# Patient Record
Sex: MALE | Race: WHITE | NOT HISPANIC OR LATINO | URBAN - METROPOLITAN AREA
[De-identification: names, ages, dates, MRNs, and addresses within clinical notes are randomized per-mention and may not be internally consistent; named-entity substitution may affect disease eponyms.]

---

## 2017-10-03 ENCOUNTER — OFFICE VISIT - HEALTHEAST (OUTPATIENT)
Dept: GERIATRICS | Facility: CLINIC | Age: 57
End: 2017-10-03

## 2017-10-03 ENCOUNTER — AMBULATORY - HEALTHEAST (OUTPATIENT)
Dept: ADMINISTRATIVE | Facility: CLINIC | Age: 57
End: 2017-10-03

## 2017-10-03 DIAGNOSIS — G47.33 OSA ON CPAP: ICD-10-CM

## 2017-10-03 DIAGNOSIS — R19.7 DIARRHEA: ICD-10-CM

## 2017-10-03 DIAGNOSIS — E11.9 DM2 (DIABETES MELLITUS, TYPE 2) (H): ICD-10-CM

## 2017-10-03 DIAGNOSIS — L03.116 LEFT LEG CELLULITIS: ICD-10-CM

## 2017-10-03 DIAGNOSIS — R09.02 HYPOXIA: ICD-10-CM

## 2017-10-03 DIAGNOSIS — I10 HTN (HYPERTENSION): ICD-10-CM

## 2017-10-03 RX ORDER — FUROSEMIDE 40 MG
40 TABLET ORAL 2 TIMES DAILY
Status: SHIPPED | COMMUNITY
Start: 2017-10-03

## 2017-10-03 RX ORDER — LIRAGLUTIDE 6 MG/ML
1.8 INJECTION SUBCUTANEOUS DAILY
Status: SHIPPED | COMMUNITY
Start: 2017-10-03

## 2017-10-06 ENCOUNTER — OFFICE VISIT - HEALTHEAST (OUTPATIENT)
Dept: GERIATRICS | Facility: CLINIC | Age: 57
End: 2017-10-06

## 2017-10-06 DIAGNOSIS — E11.9 DM2 (DIABETES MELLITUS, TYPE 2) (H): ICD-10-CM

## 2017-10-06 DIAGNOSIS — G47.33 OSA ON CPAP: ICD-10-CM

## 2017-10-06 DIAGNOSIS — L03.116 LEFT LEG CELLULITIS: ICD-10-CM

## 2017-10-06 DIAGNOSIS — I10 HTN (HYPERTENSION): ICD-10-CM

## 2017-10-10 ENCOUNTER — OFFICE VISIT - HEALTHEAST (OUTPATIENT)
Dept: GERIATRICS | Facility: CLINIC | Age: 57
End: 2017-10-10

## 2017-10-10 DIAGNOSIS — R09.02 HYPOXIA: ICD-10-CM

## 2017-10-10 DIAGNOSIS — I10 HTN (HYPERTENSION): ICD-10-CM

## 2017-10-10 DIAGNOSIS — L03.116 LEFT LEG CELLULITIS: ICD-10-CM

## 2017-10-10 DIAGNOSIS — E11.9 DM2 (DIABETES MELLITUS, TYPE 2) (H): ICD-10-CM

## 2017-10-17 ENCOUNTER — OFFICE VISIT - HEALTHEAST (OUTPATIENT)
Dept: GERIATRICS | Facility: CLINIC | Age: 57
End: 2017-10-17

## 2017-10-17 DIAGNOSIS — I48.91 A-FIB (H): ICD-10-CM

## 2017-10-17 DIAGNOSIS — G47.33 OSA ON CPAP: ICD-10-CM

## 2017-10-17 DIAGNOSIS — I10 HYPERTENSION: ICD-10-CM

## 2017-10-17 DIAGNOSIS — I89.0 LYMPHEDEMA: ICD-10-CM

## 2017-10-17 DIAGNOSIS — E11.9 DIABETES MELLITUS (H): ICD-10-CM

## 2017-10-17 DIAGNOSIS — R26.89 BALANCE DISORDER: ICD-10-CM

## 2017-10-17 DIAGNOSIS — R29.898 LEG WEAKNESS: ICD-10-CM

## 2017-10-20 ENCOUNTER — OFFICE VISIT - HEALTHEAST (OUTPATIENT)
Dept: GERIATRICS | Facility: CLINIC | Age: 57
End: 2017-10-20

## 2017-10-20 DIAGNOSIS — R26.89 BALANCE DISORDER: ICD-10-CM

## 2017-10-20 DIAGNOSIS — S81.802A LEG WOUND, LEFT: ICD-10-CM

## 2017-10-20 DIAGNOSIS — R29.898 WEAKNESS OF BOTH LOWER EXTREMITIES: ICD-10-CM

## 2017-10-20 DIAGNOSIS — I10 HYPERTENSION: ICD-10-CM

## 2017-10-20 DIAGNOSIS — I89.0 LYMPHEDEMA: ICD-10-CM

## 2017-10-24 ENCOUNTER — OFFICE VISIT - HEALTHEAST (OUTPATIENT)
Dept: GERIATRICS | Facility: CLINIC | Age: 57
End: 2017-10-24

## 2017-10-24 DIAGNOSIS — I89.0 LYMPHEDEMA: ICD-10-CM

## 2017-10-24 DIAGNOSIS — I10 HYPERTENSION: ICD-10-CM

## 2017-10-24 DIAGNOSIS — R29.898 WEAKNESS OF BOTH LOWER EXTREMITIES: ICD-10-CM

## 2017-10-24 DIAGNOSIS — E11.9 DIABETES MELLITUS (H): ICD-10-CM

## 2017-10-27 ENCOUNTER — OFFICE VISIT - HEALTHEAST (OUTPATIENT)
Dept: GERIATRICS | Facility: CLINIC | Age: 57
End: 2017-10-27

## 2017-10-27 DIAGNOSIS — R29.898 WEAKNESS OF BOTH LOWER EXTREMITIES: ICD-10-CM

## 2017-10-27 DIAGNOSIS — M25.569 KNEE PAIN: ICD-10-CM

## 2017-10-27 DIAGNOSIS — I10 HYPERTENSION: ICD-10-CM

## 2017-10-27 DIAGNOSIS — M22.2X9 PATELLA-FEMORAL SYNDROME: ICD-10-CM

## 2017-10-31 ENCOUNTER — OFFICE VISIT - HEALTHEAST (OUTPATIENT)
Dept: GERIATRICS | Facility: CLINIC | Age: 57
End: 2017-10-31

## 2017-10-31 DIAGNOSIS — G47.33 OSA ON CPAP: ICD-10-CM

## 2017-10-31 DIAGNOSIS — I10 HYPERTENSION: ICD-10-CM

## 2017-10-31 DIAGNOSIS — I89.0 LYMPHEDEMA: ICD-10-CM

## 2017-10-31 DIAGNOSIS — M22.2X9 PATELLA-FEMORAL SYNDROME: ICD-10-CM

## 2017-11-03 ENCOUNTER — OFFICE VISIT - HEALTHEAST (OUTPATIENT)
Dept: GERIATRICS | Facility: CLINIC | Age: 57
End: 2017-11-03

## 2017-11-03 DIAGNOSIS — I10 HYPERTENSION: ICD-10-CM

## 2017-11-03 DIAGNOSIS — G47.33 OSA ON CPAP: ICD-10-CM

## 2017-11-03 DIAGNOSIS — E11.9 DIABETES MELLITUS (H): ICD-10-CM

## 2017-11-03 DIAGNOSIS — R29.898 WEAKNESS OF BOTH LOWER EXTREMITIES: ICD-10-CM

## 2017-11-07 ENCOUNTER — OFFICE VISIT - HEALTHEAST (OUTPATIENT)
Dept: GERIATRICS | Facility: CLINIC | Age: 57
End: 2017-11-07

## 2017-11-07 DIAGNOSIS — I10 HYPERTENSION: ICD-10-CM

## 2017-11-07 DIAGNOSIS — R26.89 BALANCE DISORDER: ICD-10-CM

## 2017-11-07 DIAGNOSIS — R29.898 WEAKNESS OF BOTH LOWER EXTREMITIES: ICD-10-CM

## 2017-11-07 DIAGNOSIS — I89.0 LYMPHEDEMA: ICD-10-CM

## 2017-11-10 ENCOUNTER — AMBULATORY - HEALTHEAST (OUTPATIENT)
Dept: GERIATRICS | Facility: CLINIC | Age: 57
End: 2017-11-10

## 2018-04-30 ENCOUNTER — COMMUNICATION - HEALTHEAST (OUTPATIENT)
Dept: TELEHEALTH | Facility: CLINIC | Age: 58
End: 2018-04-30

## 2018-04-30 ENCOUNTER — OFFICE VISIT - HEALTHEAST (OUTPATIENT)
Dept: VASCULAR SURGERY | Facility: CLINIC | Age: 58
End: 2018-04-30

## 2018-04-30 DIAGNOSIS — M79.89 LEG SWELLING: ICD-10-CM

## 2018-04-30 DIAGNOSIS — I87.2 VENOUS INSUFFICIENCY OF BOTH LOWER EXTREMITIES: ICD-10-CM

## 2018-04-30 DIAGNOSIS — I89.0 ACQUIRED LYMPHEDEMA OF LEG: ICD-10-CM

## 2018-04-30 DIAGNOSIS — M14.671 CHARCOT'S JOINT, RIGHT ANKLE AND FOOT: ICD-10-CM

## 2018-04-30 DIAGNOSIS — E11.42 DIABETIC PERIPHERAL NEUROPATHY ASSOCIATED WITH TYPE 2 DIABETES MELLITUS (H): ICD-10-CM

## 2018-04-30 DIAGNOSIS — M79.604 PAIN IN BOTH LOWER EXTREMITIES: ICD-10-CM

## 2018-04-30 DIAGNOSIS — E66.01 MORBID OBESITY WITH BMI OF 50.0-59.9, ADULT (H): ICD-10-CM

## 2018-04-30 DIAGNOSIS — M79.89 SWELLING OF LEFT EXTREMITY: ICD-10-CM

## 2018-04-30 DIAGNOSIS — M79.605 PAIN IN BOTH LOWER EXTREMITIES: ICD-10-CM

## 2018-04-30 DIAGNOSIS — L90.5 SCAR CONDITION AND FIBROSIS OF SKIN: ICD-10-CM

## 2018-04-30 ASSESSMENT — MIFFLIN-ST. JEOR: SCORE: 2808.47

## 2018-05-01 ENCOUNTER — COMMUNICATION - HEALTHEAST (OUTPATIENT)
Dept: SURGERY | Facility: CLINIC | Age: 58
End: 2018-05-01

## 2018-05-03 ENCOUNTER — RECORDS - HEALTHEAST (OUTPATIENT)
Dept: VASCULAR ULTRASOUND | Facility: CLINIC | Age: 58
End: 2018-05-03

## 2018-05-03 DIAGNOSIS — M79.605 PAIN IN LEFT LEG: ICD-10-CM

## 2018-05-03 DIAGNOSIS — M79.89 OTHER SPECIFIED SOFT TISSUE DISORDERS: ICD-10-CM

## 2018-05-03 DIAGNOSIS — I87.2 VENOUS INSUFFICIENCY (CHRONIC) (PERIPHERAL): ICD-10-CM

## 2018-05-03 DIAGNOSIS — M14.671 CHARCOT'S JOINT, RIGHT ANKLE AND FOOT: ICD-10-CM

## 2018-05-03 DIAGNOSIS — E11.42 TYPE 2 DIABETES MELLITUS WITH DIABETIC POLYNEUROPATHY (H): ICD-10-CM

## 2018-05-03 DIAGNOSIS — L90.5 SCAR CONDITIONS AND FIBROSIS OF SKIN: ICD-10-CM

## 2018-05-03 DIAGNOSIS — I89.0 LYMPHEDEMA, NOT ELSEWHERE CLASSIFIED: ICD-10-CM

## 2018-05-03 DIAGNOSIS — M79.604 PAIN IN RIGHT LEG: ICD-10-CM

## 2018-05-03 DIAGNOSIS — E66.01 MORBID (SEVERE) OBESITY DUE TO EXCESS CALORIES (H): ICD-10-CM

## 2018-05-04 ENCOUNTER — COMMUNICATION - HEALTHEAST (OUTPATIENT)
Dept: VASCULAR SURGERY | Facility: CLINIC | Age: 58
End: 2018-05-04

## 2018-05-07 ENCOUNTER — COMMUNICATION - HEALTHEAST (OUTPATIENT)
Dept: VASCULAR SURGERY | Facility: CLINIC | Age: 58
End: 2018-05-07

## 2018-05-16 ENCOUNTER — OFFICE VISIT - HEALTHEAST (OUTPATIENT)
Dept: PHYSICAL THERAPY | Facility: REHABILITATION | Age: 58
End: 2018-05-16

## 2018-05-16 DIAGNOSIS — I87.2 VENOUS INSUFFICIENCY OF BOTH LOWER EXTREMITIES: ICD-10-CM

## 2018-05-16 DIAGNOSIS — L90.5 SCAR CONDITION AND FIBROSIS OF SKIN: ICD-10-CM

## 2018-05-16 DIAGNOSIS — E66.01 MORBID OBESITY WITH BMI OF 50.0-59.9, ADULT (H): ICD-10-CM

## 2018-05-16 DIAGNOSIS — I89.0 ACQUIRED LYMPHEDEMA OF LEG: ICD-10-CM

## 2018-05-16 DIAGNOSIS — M79.89 SWELLING OF LEFT EXTREMITY: ICD-10-CM

## 2018-05-16 DIAGNOSIS — M79.605 PAIN IN BOTH LOWER EXTREMITIES: ICD-10-CM

## 2018-05-16 DIAGNOSIS — M79.89 LEG SWELLING: ICD-10-CM

## 2018-05-16 DIAGNOSIS — M14.671 CHARCOT'S JOINT, RIGHT ANKLE AND FOOT: ICD-10-CM

## 2018-05-16 DIAGNOSIS — M79.604 PAIN IN BOTH LOWER EXTREMITIES: ICD-10-CM

## 2018-05-16 DIAGNOSIS — E11.42 DIABETIC PERIPHERAL NEUROPATHY ASSOCIATED WITH TYPE 2 DIABETES MELLITUS (H): ICD-10-CM

## 2018-05-18 ENCOUNTER — OFFICE VISIT - HEALTHEAST (OUTPATIENT)
Dept: PHYSICAL THERAPY | Facility: REHABILITATION | Age: 58
End: 2018-05-18

## 2018-05-18 DIAGNOSIS — E11.42 DIABETIC PERIPHERAL NEUROPATHY ASSOCIATED WITH TYPE 2 DIABETES MELLITUS (H): ICD-10-CM

## 2018-05-18 DIAGNOSIS — E66.01 MORBID OBESITY WITH BMI OF 50.0-59.9, ADULT (H): ICD-10-CM

## 2018-05-18 DIAGNOSIS — I89.0 ACQUIRED LYMPHEDEMA OF LEG: ICD-10-CM

## 2018-05-18 DIAGNOSIS — M14.671 CHARCOT'S JOINT, RIGHT ANKLE AND FOOT: ICD-10-CM

## 2018-05-18 DIAGNOSIS — I87.2 VENOUS INSUFFICIENCY OF BOTH LOWER EXTREMITIES: ICD-10-CM

## 2018-05-18 DIAGNOSIS — M79.89 LEG SWELLING: ICD-10-CM

## 2018-05-18 DIAGNOSIS — M79.604 PAIN IN BOTH LOWER EXTREMITIES: ICD-10-CM

## 2018-05-18 DIAGNOSIS — L90.5 SCAR CONDITION AND FIBROSIS OF SKIN: ICD-10-CM

## 2018-05-18 DIAGNOSIS — M79.89 SWELLING OF LEFT EXTREMITY: ICD-10-CM

## 2018-05-18 DIAGNOSIS — M79.605 PAIN IN BOTH LOWER EXTREMITIES: ICD-10-CM

## 2018-05-22 ENCOUNTER — OFFICE VISIT - HEALTHEAST (OUTPATIENT)
Dept: PHYSICAL THERAPY | Facility: REHABILITATION | Age: 58
End: 2018-05-22

## 2018-05-22 DIAGNOSIS — E11.42 DIABETIC PERIPHERAL NEUROPATHY ASSOCIATED WITH TYPE 2 DIABETES MELLITUS (H): ICD-10-CM

## 2018-05-22 DIAGNOSIS — L90.5 SCAR CONDITION AND FIBROSIS OF SKIN: ICD-10-CM

## 2018-05-22 DIAGNOSIS — I87.2 VENOUS INSUFFICIENCY OF BOTH LOWER EXTREMITIES: ICD-10-CM

## 2018-05-22 DIAGNOSIS — M79.604 PAIN IN BOTH LOWER EXTREMITIES: ICD-10-CM

## 2018-05-22 DIAGNOSIS — E66.01 MORBID OBESITY WITH BMI OF 50.0-59.9, ADULT (H): ICD-10-CM

## 2018-05-22 DIAGNOSIS — M79.605 PAIN IN BOTH LOWER EXTREMITIES: ICD-10-CM

## 2018-05-22 DIAGNOSIS — M14.671 CHARCOT'S JOINT, RIGHT ANKLE AND FOOT: ICD-10-CM

## 2018-05-22 DIAGNOSIS — I89.0 ACQUIRED LYMPHEDEMA OF LEG: ICD-10-CM

## 2018-05-22 DIAGNOSIS — M79.89 SWELLING OF LEFT EXTREMITY: ICD-10-CM

## 2018-05-22 DIAGNOSIS — M79.89 LEG SWELLING: ICD-10-CM

## 2018-05-24 ENCOUNTER — OFFICE VISIT - HEALTHEAST (OUTPATIENT)
Dept: PHYSICAL THERAPY | Facility: REHABILITATION | Age: 58
End: 2018-05-24

## 2018-05-24 DIAGNOSIS — M79.89 SWELLING OF LEFT EXTREMITY: ICD-10-CM

## 2018-05-24 DIAGNOSIS — L90.5 SCAR CONDITION AND FIBROSIS OF SKIN: ICD-10-CM

## 2018-05-24 DIAGNOSIS — I87.2 VENOUS INSUFFICIENCY OF BOTH LOWER EXTREMITIES: ICD-10-CM

## 2018-05-24 DIAGNOSIS — M79.89 LEG SWELLING: ICD-10-CM

## 2018-05-25 ENCOUNTER — COMMUNICATION - HEALTHEAST (OUTPATIENT)
Dept: VASCULAR SURGERY | Facility: CLINIC | Age: 58
End: 2018-05-25

## 2018-05-29 ENCOUNTER — OFFICE VISIT - HEALTHEAST (OUTPATIENT)
Dept: PHYSICAL THERAPY | Facility: REHABILITATION | Age: 58
End: 2018-05-29

## 2018-05-29 DIAGNOSIS — M79.605 PAIN IN BOTH LOWER EXTREMITIES: ICD-10-CM

## 2018-05-29 DIAGNOSIS — M79.89 SWELLING OF LEFT EXTREMITY: ICD-10-CM

## 2018-05-29 DIAGNOSIS — M79.604 PAIN IN BOTH LOWER EXTREMITIES: ICD-10-CM

## 2018-05-29 DIAGNOSIS — E66.01 MORBID OBESITY WITH BMI OF 50.0-59.9, ADULT (H): ICD-10-CM

## 2018-05-29 DIAGNOSIS — E11.42 DIABETIC PERIPHERAL NEUROPATHY ASSOCIATED WITH TYPE 2 DIABETES MELLITUS (H): ICD-10-CM

## 2018-05-29 DIAGNOSIS — I87.2 VENOUS INSUFFICIENCY OF BOTH LOWER EXTREMITIES: ICD-10-CM

## 2018-05-29 DIAGNOSIS — I89.0 ACQUIRED LYMPHEDEMA OF LEG: ICD-10-CM

## 2018-05-29 DIAGNOSIS — M79.89 LEG SWELLING: ICD-10-CM

## 2018-05-29 DIAGNOSIS — M14.671 CHARCOT'S JOINT, RIGHT ANKLE AND FOOT: ICD-10-CM

## 2018-05-29 DIAGNOSIS — L90.5 SCAR CONDITION AND FIBROSIS OF SKIN: ICD-10-CM

## 2018-05-30 ENCOUNTER — OFFICE VISIT - HEALTHEAST (OUTPATIENT)
Dept: VASCULAR SURGERY | Facility: CLINIC | Age: 58
End: 2018-05-30

## 2018-05-30 DIAGNOSIS — L90.5 SCAR CONDITION AND FIBROSIS OF SKIN: ICD-10-CM

## 2018-05-30 DIAGNOSIS — E11.42 DIABETIC PERIPHERAL NEUROPATHY ASSOCIATED WITH TYPE 2 DIABETES MELLITUS (H): ICD-10-CM

## 2018-05-30 DIAGNOSIS — I89.0 ACQUIRED LYMPHEDEMA OF LEG: ICD-10-CM

## 2018-05-30 DIAGNOSIS — L97.223 ULCER OF LEFT CALF WITH NECROSIS OF MUSCLE (H): ICD-10-CM

## 2018-05-30 DIAGNOSIS — I87.2 VENOUS INSUFFICIENCY OF BOTH LOWER EXTREMITIES: ICD-10-CM

## 2018-05-30 DIAGNOSIS — E66.01 MORBID OBESITY WITH BMI OF 50.0-59.9, ADULT (H): ICD-10-CM

## 2018-05-30 ASSESSMENT — MIFFLIN-ST. JEOR: SCORE: 2808.47

## 2018-06-01 ENCOUNTER — OFFICE VISIT - HEALTHEAST (OUTPATIENT)
Dept: PHYSICAL THERAPY | Facility: REHABILITATION | Age: 58
End: 2018-06-01

## 2018-06-01 DIAGNOSIS — I87.2 VENOUS INSUFFICIENCY OF BOTH LOWER EXTREMITIES: ICD-10-CM

## 2018-06-01 DIAGNOSIS — M14.671 CHARCOT'S JOINT, RIGHT ANKLE AND FOOT: ICD-10-CM

## 2018-06-01 DIAGNOSIS — L90.5 SCAR CONDITION AND FIBROSIS OF SKIN: ICD-10-CM

## 2018-06-01 DIAGNOSIS — I89.0 ACQUIRED LYMPHEDEMA OF LEG: ICD-10-CM

## 2018-06-01 DIAGNOSIS — M79.89 SWELLING OF LEFT EXTREMITY: ICD-10-CM

## 2018-06-01 DIAGNOSIS — M79.89 LEG SWELLING: ICD-10-CM

## 2018-06-01 DIAGNOSIS — M79.605 PAIN IN BOTH LOWER EXTREMITIES: ICD-10-CM

## 2018-06-01 DIAGNOSIS — E11.42 DIABETIC PERIPHERAL NEUROPATHY ASSOCIATED WITH TYPE 2 DIABETES MELLITUS (H): ICD-10-CM

## 2018-06-01 DIAGNOSIS — E66.01 MORBID OBESITY WITH BMI OF 50.0-59.9, ADULT (H): ICD-10-CM

## 2018-06-01 DIAGNOSIS — M79.604 PAIN IN BOTH LOWER EXTREMITIES: ICD-10-CM

## 2018-06-02 LAB
BACTERIA SPEC CULT: ABNORMAL
GRAM STAIN RESULT: ABNORMAL
GRAM STAIN RESULT: ABNORMAL

## 2018-06-05 ENCOUNTER — OFFICE VISIT - HEALTHEAST (OUTPATIENT)
Dept: PHYSICAL THERAPY | Facility: REHABILITATION | Age: 58
End: 2018-06-05

## 2018-06-05 DIAGNOSIS — I89.0 ACQUIRED LYMPHEDEMA OF LEG: ICD-10-CM

## 2018-06-05 DIAGNOSIS — L90.5 SCAR CONDITION AND FIBROSIS OF SKIN: ICD-10-CM

## 2018-06-05 DIAGNOSIS — M79.605 PAIN IN BOTH LOWER EXTREMITIES: ICD-10-CM

## 2018-06-05 DIAGNOSIS — I87.2 VENOUS INSUFFICIENCY OF BOTH LOWER EXTREMITIES: ICD-10-CM

## 2018-06-05 DIAGNOSIS — E66.01 MORBID OBESITY WITH BMI OF 50.0-59.9, ADULT (H): ICD-10-CM

## 2018-06-05 DIAGNOSIS — M79.89 SWELLING OF LEFT EXTREMITY: ICD-10-CM

## 2018-06-05 DIAGNOSIS — E11.42 DIABETIC PERIPHERAL NEUROPATHY ASSOCIATED WITH TYPE 2 DIABETES MELLITUS (H): ICD-10-CM

## 2018-06-05 DIAGNOSIS — M79.604 PAIN IN BOTH LOWER EXTREMITIES: ICD-10-CM

## 2018-06-05 DIAGNOSIS — M79.89 LEG SWELLING: ICD-10-CM

## 2018-06-05 DIAGNOSIS — M14.671 CHARCOT'S JOINT, RIGHT ANKLE AND FOOT: ICD-10-CM

## 2018-06-06 ENCOUNTER — OFFICE VISIT - HEALTHEAST (OUTPATIENT)
Dept: PHYSICAL THERAPY | Facility: REHABILITATION | Age: 58
End: 2018-06-06

## 2018-06-06 DIAGNOSIS — I87.2 VENOUS INSUFFICIENCY OF BOTH LOWER EXTREMITIES: ICD-10-CM

## 2018-06-06 DIAGNOSIS — M79.89 SWELLING OF LEFT EXTREMITY: ICD-10-CM

## 2018-06-06 DIAGNOSIS — I89.0 ACQUIRED LYMPHEDEMA OF LEG: ICD-10-CM

## 2018-06-06 DIAGNOSIS — L90.5 SCAR CONDITION AND FIBROSIS OF SKIN: ICD-10-CM

## 2018-06-06 DIAGNOSIS — M79.89 LEG SWELLING: ICD-10-CM

## 2018-06-08 ENCOUNTER — OFFICE VISIT - HEALTHEAST (OUTPATIENT)
Dept: PHYSICAL THERAPY | Facility: REHABILITATION | Age: 58
End: 2018-06-08

## 2018-06-08 DIAGNOSIS — E11.42 DIABETIC PERIPHERAL NEUROPATHY ASSOCIATED WITH TYPE 2 DIABETES MELLITUS (H): ICD-10-CM

## 2018-06-08 DIAGNOSIS — E66.01 MORBID OBESITY WITH BMI OF 50.0-59.9, ADULT (H): ICD-10-CM

## 2018-06-08 DIAGNOSIS — I87.2 VENOUS INSUFFICIENCY OF BOTH LOWER EXTREMITIES: ICD-10-CM

## 2018-06-08 DIAGNOSIS — M79.605 PAIN IN BOTH LOWER EXTREMITIES: ICD-10-CM

## 2018-06-08 DIAGNOSIS — M14.671 CHARCOT'S JOINT, RIGHT ANKLE AND FOOT: ICD-10-CM

## 2018-06-08 DIAGNOSIS — M79.89 SWELLING OF LEFT EXTREMITY: ICD-10-CM

## 2018-06-08 DIAGNOSIS — M79.604 PAIN IN BOTH LOWER EXTREMITIES: ICD-10-CM

## 2018-06-08 DIAGNOSIS — L90.5 SCAR CONDITION AND FIBROSIS OF SKIN: ICD-10-CM

## 2018-06-08 DIAGNOSIS — I89.0 ACQUIRED LYMPHEDEMA OF LEG: ICD-10-CM

## 2018-06-08 DIAGNOSIS — M79.89 LEG SWELLING: ICD-10-CM

## 2018-06-13 ENCOUNTER — OFFICE VISIT - HEALTHEAST (OUTPATIENT)
Dept: PHYSICAL THERAPY | Facility: REHABILITATION | Age: 58
End: 2018-06-13

## 2018-06-13 DIAGNOSIS — M79.89 SWELLING OF LEFT EXTREMITY: ICD-10-CM

## 2018-06-13 DIAGNOSIS — M79.89 LEG SWELLING: ICD-10-CM

## 2018-06-13 DIAGNOSIS — L90.5 SCAR CONDITION AND FIBROSIS OF SKIN: ICD-10-CM

## 2018-06-13 DIAGNOSIS — I87.2 VENOUS INSUFFICIENCY OF BOTH LOWER EXTREMITIES: ICD-10-CM

## 2018-06-18 ENCOUNTER — RECORDS - HEALTHEAST (OUTPATIENT)
Dept: VASCULAR ULTRASOUND | Facility: CLINIC | Age: 58
End: 2018-06-18

## 2018-06-18 ENCOUNTER — OFFICE VISIT - HEALTHEAST (OUTPATIENT)
Dept: VASCULAR SURGERY | Facility: CLINIC | Age: 58
End: 2018-06-18

## 2018-06-18 DIAGNOSIS — I87.2 VENOUS INSUFFICIENCY (CHRONIC) (PERIPHERAL): ICD-10-CM

## 2018-06-18 DIAGNOSIS — L90.5 SCAR CONDITION AND FIBROSIS OF SKIN: ICD-10-CM

## 2018-06-18 DIAGNOSIS — E66.01 MORBID OBESITY WITH BMI OF 50.0-59.9, ADULT (H): ICD-10-CM

## 2018-06-18 DIAGNOSIS — M79.89 OTHER SPECIFIED SOFT TISSUE DISORDERS: ICD-10-CM

## 2018-06-18 DIAGNOSIS — L97.922 NON-PRESSURE CHRONIC ULCER OF UNSPECIFIED PART OF LEFT LOWER LEG WITH FAT LAYER EXPOSED (H): ICD-10-CM

## 2018-06-18 DIAGNOSIS — I89.0 ACQUIRED LYMPHEDEMA OF LEG: ICD-10-CM

## 2018-06-18 DIAGNOSIS — M79.89 LEG SWELLING: ICD-10-CM

## 2018-06-18 DIAGNOSIS — L97.922 LEG ULCER, LEFT, WITH FAT LAYER EXPOSED (H): ICD-10-CM

## 2018-06-18 DIAGNOSIS — L90.5 SCAR CONDITIONS AND FIBROSIS OF SKIN: ICD-10-CM

## 2018-06-18 DIAGNOSIS — I89.0 LYMPHEDEMA, NOT ELSEWHERE CLASSIFIED: ICD-10-CM

## 2018-06-18 DIAGNOSIS — L97.223 ULCER OF LEFT CALF WITH NECROSIS OF MUSCLE (H): ICD-10-CM

## 2018-06-18 DIAGNOSIS — M79.89 SWELLING OF LEFT EXTREMITY: ICD-10-CM

## 2018-06-18 DIAGNOSIS — I87.2 VENOUS INSUFFICIENCY OF BOTH LOWER EXTREMITIES: ICD-10-CM

## 2018-06-18 DIAGNOSIS — E11.42 TYPE 2 DIABETES MELLITUS WITH DIABETIC POLYNEUROPATHY (H): ICD-10-CM

## 2018-06-18 DIAGNOSIS — E11.42 DIABETIC PERIPHERAL NEUROPATHY ASSOCIATED WITH TYPE 2 DIABETES MELLITUS (H): ICD-10-CM

## 2018-06-18 DIAGNOSIS — E66.01 MORBID (SEVERE) OBESITY DUE TO EXCESS CALORIES (H): ICD-10-CM

## 2018-06-18 DIAGNOSIS — L97.223: ICD-10-CM

## 2018-06-18 RX ORDER — DILTIAZEM HYDROCHLORIDE 360 MG/1
360 CAPSULE, EXTENDED RELEASE ORAL DAILY
Refills: 1 | Status: SHIPPED | COMMUNITY
Start: 2018-05-30

## 2018-06-18 RX ORDER — INSULIN GLARGINE 100 [IU]/ML
20 INJECTION, SOLUTION SUBCUTANEOUS 2 TIMES DAILY
Refills: 0 | Status: SHIPPED | COMMUNITY
Start: 2018-06-06

## 2018-07-23 ENCOUNTER — OFFICE VISIT - HEALTHEAST (OUTPATIENT)
Dept: VASCULAR SURGERY | Facility: CLINIC | Age: 58
End: 2018-07-23

## 2018-07-23 DIAGNOSIS — L97.223 ULCER OF LEFT CALF WITH NECROSIS OF MUSCLE (H): ICD-10-CM

## 2018-07-23 DIAGNOSIS — E66.01 MORBID OBESITY WITH BMI OF 50.0-59.9, ADULT (H): ICD-10-CM

## 2018-07-23 DIAGNOSIS — M14.671 CHARCOT'S JOINT, RIGHT ANKLE AND FOOT: ICD-10-CM

## 2018-07-23 DIAGNOSIS — I89.0 ACQUIRED LYMPHEDEMA OF LEG: ICD-10-CM

## 2018-07-23 DIAGNOSIS — I87.2 VENOUS INSUFFICIENCY OF BOTH LOWER EXTREMITIES: ICD-10-CM

## 2018-07-23 ASSESSMENT — MIFFLIN-ST. JEOR: SCORE: 2862.9

## 2018-07-27 ENCOUNTER — AMBULATORY - HEALTHEAST (OUTPATIENT)
Dept: VASCULAR SURGERY | Facility: CLINIC | Age: 58
End: 2018-07-27

## 2018-07-27 ASSESSMENT — MIFFLIN-ST. JEOR: SCORE: 2878.32

## 2018-07-31 ENCOUNTER — AMBULATORY - HEALTHEAST (OUTPATIENT)
Dept: VASCULAR SURGERY | Facility: CLINIC | Age: 58
End: 2018-07-31

## 2018-07-31 ENCOUNTER — RECORDS - HEALTHEAST (OUTPATIENT)
Dept: ADMINISTRATIVE | Facility: OTHER | Age: 58
End: 2018-07-31

## 2018-07-31 DIAGNOSIS — I87.2 VENOUS INSUFFICIENCY OF BOTH LOWER EXTREMITIES: ICD-10-CM

## 2018-08-03 ENCOUNTER — AMBULATORY - HEALTHEAST (OUTPATIENT)
Dept: VASCULAR SURGERY | Facility: CLINIC | Age: 58
End: 2018-08-03

## 2018-08-03 DIAGNOSIS — M79.89 SWELLING OF LEFT EXTREMITY: ICD-10-CM

## 2018-08-03 DIAGNOSIS — I87.2 VENOUS INSUFFICIENCY OF BOTH LOWER EXTREMITIES: ICD-10-CM

## 2018-08-03 DIAGNOSIS — L97.223 ULCER OF LEFT CALF WITH NECROSIS OF MUSCLE (H): ICD-10-CM

## 2018-08-03 DIAGNOSIS — E66.01 MORBID OBESITY WITH BMI OF 50.0-59.9, ADULT (H): ICD-10-CM

## 2018-08-03 ASSESSMENT — MIFFLIN-ST. JEOR: SCORE: 2850.65

## 2018-08-07 ENCOUNTER — AMBULATORY - HEALTHEAST (OUTPATIENT)
Dept: VASCULAR SURGERY | Facility: CLINIC | Age: 58
End: 2018-08-07

## 2018-08-07 DIAGNOSIS — M79.89 SWELLING OF LEFT EXTREMITY: ICD-10-CM

## 2018-08-07 DIAGNOSIS — I89.0 ACQUIRED LYMPHEDEMA OF LEG: ICD-10-CM

## 2018-08-07 DIAGNOSIS — L97.223 ULCER OF LEFT CALF WITH NECROSIS OF MUSCLE (H): ICD-10-CM

## 2018-08-07 DIAGNOSIS — I87.2 VENOUS INSUFFICIENCY OF BOTH LOWER EXTREMITIES: ICD-10-CM

## 2018-08-07 ASSESSMENT — MIFFLIN-ST. JEOR: SCORE: 2861.08

## 2018-08-10 ENCOUNTER — RECORDS - HEALTHEAST (OUTPATIENT)
Dept: ADMINISTRATIVE | Facility: OTHER | Age: 58
End: 2018-08-10

## 2018-08-14 ENCOUNTER — AMBULATORY - HEALTHEAST (OUTPATIENT)
Dept: VASCULAR SURGERY | Facility: CLINIC | Age: 58
End: 2018-08-14

## 2018-08-14 DIAGNOSIS — L97.922 LEG ULCER, LEFT, WITH FAT LAYER EXPOSED (H): ICD-10-CM

## 2018-08-17 ENCOUNTER — AMBULATORY - HEALTHEAST (OUTPATIENT)
Dept: VASCULAR SURGERY | Facility: CLINIC | Age: 58
End: 2018-08-17

## 2018-08-17 DIAGNOSIS — I87.2 VENOUS INSUFFICIENCY OF BOTH LOWER EXTREMITIES: ICD-10-CM

## 2018-08-17 DIAGNOSIS — I89.0 ACQUIRED LYMPHEDEMA OF LEG: ICD-10-CM

## 2018-08-17 DIAGNOSIS — L97.922 LEG ULCER, LEFT, WITH FAT LAYER EXPOSED (H): ICD-10-CM

## 2018-08-17 DIAGNOSIS — M79.89 SWELLING OF LEFT EXTREMITY: ICD-10-CM

## 2018-08-17 ASSESSMENT — MIFFLIN-ST. JEOR: SCORE: 2858.36

## 2018-08-21 ENCOUNTER — OFFICE VISIT - HEALTHEAST (OUTPATIENT)
Dept: VASCULAR SURGERY | Facility: CLINIC | Age: 58
End: 2018-08-21

## 2018-08-21 ENCOUNTER — RECORDS - HEALTHEAST (OUTPATIENT)
Dept: ADMINISTRATIVE | Facility: OTHER | Age: 58
End: 2018-08-21

## 2018-08-21 DIAGNOSIS — L97.223 ULCER OF LEFT CALF WITH NECROSIS OF MUSCLE (H): ICD-10-CM

## 2018-08-21 DIAGNOSIS — L97.922 LEG ULCER, LEFT, WITH FAT LAYER EXPOSED (H): ICD-10-CM

## 2018-08-21 DIAGNOSIS — I89.0 ACQUIRED LYMPHEDEMA OF LEG: ICD-10-CM

## 2018-08-21 DIAGNOSIS — E66.01 MORBID OBESITY WITH BMI OF 50.0-59.9, ADULT (H): ICD-10-CM

## 2018-08-21 DIAGNOSIS — I87.2 VENOUS INSUFFICIENCY OF BOTH LOWER EXTREMITIES: ICD-10-CM

## 2018-08-21 DIAGNOSIS — M79.89 SWELLING OF LEFT EXTREMITY: ICD-10-CM

## 2018-08-21 DIAGNOSIS — L90.5 SCAR CONDITION AND FIBROSIS OF SKIN: ICD-10-CM

## 2018-08-21 DIAGNOSIS — M14.671 CHARCOT'S JOINT, RIGHT ANKLE AND FOOT: ICD-10-CM

## 2018-08-21 ASSESSMENT — MIFFLIN-ST. JEOR: SCORE: 2860.63

## 2018-09-12 ENCOUNTER — OFFICE VISIT - HEALTHEAST (OUTPATIENT)
Dept: VASCULAR SURGERY | Facility: CLINIC | Age: 58
End: 2018-09-12

## 2018-09-12 DIAGNOSIS — I89.0 ACQUIRED LYMPHEDEMA OF LEG: ICD-10-CM

## 2018-09-12 DIAGNOSIS — E66.01 MORBID OBESITY WITH BMI OF 50.0-59.9, ADULT (H): ICD-10-CM

## 2018-09-12 DIAGNOSIS — L97.922 LEG ULCER, LEFT, WITH FAT LAYER EXPOSED (H): ICD-10-CM

## 2018-09-12 DIAGNOSIS — I87.2 VENOUS INSUFFICIENCY OF BOTH LOWER EXTREMITIES: ICD-10-CM

## 2018-09-12 DIAGNOSIS — L97.223 ULCER OF LEFT CALF WITH NECROSIS OF MUSCLE (H): ICD-10-CM

## 2018-09-12 DIAGNOSIS — M79.89 SWELLING OF LEFT EXTREMITY: ICD-10-CM

## 2018-09-12 RX ORDER — LATANOPROST 50 UG/ML
SOLUTION/ DROPS OPHTHALMIC
Refills: 0 | Status: SHIPPED | COMMUNITY
Start: 2018-08-24

## 2018-09-12 RX ORDER — DORZOLAMIDE HYDROCHLORIDE AND TIMOLOL MALEATE 20; 5 MG/ML; MG/ML
SOLUTION/ DROPS OPHTHALMIC
Refills: 0 | Status: SHIPPED | COMMUNITY
Start: 2018-08-24

## 2018-09-12 ASSESSMENT — MIFFLIN-ST. JEOR: SCORE: 2896.46

## 2021-05-27 ENCOUNTER — RECORDS - HEALTHEAST (OUTPATIENT)
Dept: ADMINISTRATIVE | Facility: CLINIC | Age: 61
End: 2021-05-27

## 2021-06-01 VITALS — BODY MASS INDEX: 40.43 KG/M2 | WEIGHT: 315 LBS | HEIGHT: 74 IN

## 2021-06-01 VITALS — HEIGHT: 74 IN | WEIGHT: 315 LBS | BODY MASS INDEX: 40.43 KG/M2

## 2021-06-01 VITALS — WEIGHT: 315 LBS | BODY MASS INDEX: 40.43 KG/M2 | HEIGHT: 74 IN

## 2021-06-01 VITALS — HEIGHT: 74 IN | BODY MASS INDEX: 40.43 KG/M2 | WEIGHT: 315 LBS

## 2021-06-01 VITALS — WEIGHT: 315 LBS | HEIGHT: 74 IN | BODY MASS INDEX: 40.43 KG/M2

## 2021-06-13 NOTE — PROGRESS NOTES
[facility]    Facility:   [facility] [[id number]]   Code Status: FULL CODE      CHIEF COMPLAINT/REASON FOR VISIT:  Chief Complaint   Patient presents with     Follow Up     cell, rehab       HISTORY:      HPI: Ta is a 57 y.o. male who I had a chance to visit with secondary to his hospitalization September 17, 2017 secondary left lower extremity cellulitis along with a history of lymphedema morbid obesity generalized weakness and hypertension.  Recently earlier in the week decrease the diltiazem 240 mg rather than 360 mg and overall the blood pressures did seem to improve he was having some hypotensive episodes.  His appetite is good.  I did see him work with therapy today he does have generalized weakness and that does need a lot of assistance with ADLs.  Did see him ambulate about 10 feet he is able to take one stair.  I looked at his left leg after he was unwrapped and overall the leg looks good no secondary infection or cellulitis.  His bowels and stools have improved.  He has been room air and afebrile.    Past Medical History:   Diagnosis Date     A-fib 12/2015     KAITLIN (acute kidney injury)      Allergic rhinitis      Cellulitis of left lower extremity      Depressive disorder      Diabetes mellitus      Dysmetabolic syndrome X      HLD (hyperlipidemia)      Hypertension      Hyponatremia      Morbid obesity      TRUE on CPAP      Sepsis      Streptococcal bacteremia      Tobacco use disorder      Varicose vein of leg              Family History   Problem Relation Age of Onset     CABG Father      Heart disease Father      Coronary artery disease Father      Emphysema Father      Diabetes Mother      Social History     Social History     Marital status: Single     Spouse name: N/A     Number of children: N/A     Years of education: N/A     Social History Main Topics     Smoking status: Former Smoker     Types: Cigarettes     Quit date: 9/19/2014     Smokeless  tobacco: Never Used     Alcohol use No     Drug use: No     Sexual activity: No     Other Topics Concern     Not on file     Social History Narrative         Review of Systems  Currently he denies any chills or fever coughing wheezing chest pain dizziness or vertigo nausea vomiting headaches or stiff neck or rashes.  History of sleep apnea diabetes A. fib hypertension lymphedema      Current Outpatient Prescriptions:      acetaminophen (TYLENOL) 325 MG tablet, Take 650 mg by mouth every 4 (four) hours as needed for pain., Disp: , Rfl:      aspirin 81 mg chewable tablet, Chew 81 mg daily., Disp: , Rfl:      cholecalciferol, vitamin D3, (VITAMIN D3) 1,000 unit capsule, Take 3,000 Units by mouth daily., Disp: , Rfl:      diltiazem (CARDIZEM CD) 180 MG 24 hr capsule, Take 240 mg by mouth daily. , Disp: , Rfl:      flecainide (TAMBOCOR) 50 MG tablet, Take 50 mg by mouth every 12 (twelve) hours., Disp: , Rfl:      furosemide (LASIX) 40 MG tablet, Take 40 mg by mouth 2 (two) times a day., Disp: , Rfl:      glipiZIDE (GLUCOTROL) 10 MG 24 hr tablet, Take 10 mg by mouth 2 (two) times a day before meals. , Disp: , Rfl:      hydrALAZINE (APRESOLINE) 25 MG tablet, Take 25 mg by mouth 4 (four) times a day., Disp: , Rfl:      insulin aspart (NOVOLOG) 100 unit/mL injection, Inject under the skin 3 (three) times a day. Per sliding scale, Disp: , Rfl:      insulin glargine (LANTUS) 100 unit/mL injection, Inject 50 Units under the skin 2 (two) times a day. , Disp: , Rfl:      liraglutide (VICTOZA) 0.6 mg/0.1 mL (18 mg/3 mL) PnIj injection, Inject 1.8 mg under the skin daily., Disp: , Rfl:      lisinopril (PRINIVIL,ZESTRIL) 20 MG tablet, Take 20 mg by mouth 2 (two) times a day., Disp: , Rfl:      oxyCODONE (ROXICODONE) 5 MG immediate release tablet, Take 5 mg by mouth every 4 (four) hours as needed for pain., Disp: , Rfl:      propranolol (INDERAL) 20 MG tablet, Take 20 mg by mouth 2 (two) times a day., Disp: , Rfl:      rivaroxaban 20  mg Tab, Take 20 mg by mouth daily with supper., Disp: , Rfl:      senna-docusate (SENNOSIDES-DOCUSATE SODIUM) 8.6-50 mg tablet, Take 2 tablets by mouth 2 (two) times a day., Disp: , Rfl:     .There were no vitals filed for this visit.  Blood pressure 1 4456 pulse 81 respirations 18 saturation room air 97%  Physical Exam  Constitutional: He is oriented to person, place, and time. No distress.   HENT:   Head: Normocephalic.   Eyes: Pupils are equal, round, and reactive to light.   Neck: Neck supple. No thyromegaly present.   Cardiovascular: Normal rate, regular rhythm and normal heart sounds.    History of A. fib.  Chronic lymphedema.   Pulmonary/Chest: Breath sounds normal.   Obstructive sleep apnea, CPAP   Abdominal: Bowel sounds are normal. There is no tenderness. There is no guarding.   Musculoskeletal:   Generalized weakness   Lymphadenopathy:     He has no cervical adenopathy.   Neurological: He is oriented to person, place, and time.   Skin: Skin is warm and dry. No rash noted.  Left lower extremity resolved of cellulitis  LABS:   No results found for: WBC, HGB, HCT, MCV, PLT  No results found for this or any previous visit.          ASSESSMENT:      ICD-10-CM    1. Hypertension I10    2. Lymphedema I89.0    3. Leg wound, left S81.802A    4. Balance disorder R26.89    5. Weakness of both lower extremities R29.898        PLAN:    At this time continue to manage and follow.  Continue to encourage his therapy and various exercises.  Continue to monitor blood pressures and overall status.    .    Electronically signed by: Michael Duane Johnson, CNP

## 2021-06-13 NOTE — PROGRESS NOTES
[facility]    Facility:   [facility] [[id number]]   Code Status: FULL CODE      CHIEF COMPLAINT/REASON FOR VISIT:  Chief Complaint   Patient presents with     Follow Up     knee, rehab       HISTORY:      HPI: Ta is a 57 y.o. male who I had a chance to visit with again today secondary to his hospitalization in September secondary to left lower extremity cellulitis which is now resolved but also has been participating in therapy due to his chronic medical conditions as well as his chronic left knee pain but in talking with therapy today they state that he is able ambulate with encouragement but otherwise does need a lot of encouragement in order to ambulate and apparently he is planning on going home sooner they are planning on discharging him and they feel that even though he does have chronicity to his knees and we talked about patellofemoral syndrome that he has been able to tolerate therapy up to this point I did talk about other measures including further follow-up but he already has had a workup in the hospital including x-rays I did discuss a cortisone injection but they do not feel it is necessary at this time.  He has been normotensive and afebrile.  Blood sugars occasionally erratic depends on his appetite but they can run anywhere from 108 all the way up to 200.  He has been normotensive and afebrile.    Past Medical History:   Diagnosis Date     A-fib 12/2015     KAITLIN (acute kidney injury)      Allergic rhinitis      Cellulitis of left lower extremity      Depressive disorder      Diabetes mellitus      Dysmetabolic syndrome X      HLD (hyperlipidemia)      Hypertension      Hyponatremia      Morbid obesity      TRUE on CPAP      Sepsis      Streptococcal bacteremia      Tobacco use disorder      Varicose vein of leg              Family History   Problem Relation Age of Onset     CABG Father      Heart disease Father      Coronary artery disease Father       Emphysema Father      Diabetes Mother      Social History     Social History     Marital status: Single     Spouse name: N/A     Number of children: N/A     Years of education: N/A     Social History Main Topics     Smoking status: Former Smoker     Types: Cigarettes     Quit date: 9/19/2014     Smokeless tobacco: Never Used     Alcohol use No     Drug use: No     Sexual activity: No     Other Topics Concern     Not on file     Social History Narrative         Review of Systems  Currently he denies any chills or fever coughing wheezing chest pain dizziness or vertigo nausea vomiting headaches or stiff neck or rashes.  History of sleep apnea diabetes A. fib hypertension lymphedema        Current Outpatient Prescriptions:      acetaminophen (TYLENOL) 325 MG tablet, Take 650 mg by mouth every 4 (four) hours as needed for pain., Disp: , Rfl:      aspirin 81 mg chewable tablet, Chew 81 mg daily., Disp: , Rfl:      cholecalciferol, vitamin D3, (VITAMIN D3) 1,000 unit capsule, Take 3,000 Units by mouth daily., Disp: , Rfl:      diltiazem (CARDIZEM CD) 180 MG 24 hr capsule, Take 240 mg by mouth daily. , Disp: , Rfl:      flecainide (TAMBOCOR) 50 MG tablet, Take 50 mg by mouth every 12 (twelve) hours., Disp: , Rfl:      furosemide (LASIX) 40 MG tablet, Take 40 mg by mouth 2 (two) times a day., Disp: , Rfl:      glipiZIDE (GLUCOTROL) 10 MG 24 hr tablet, Take 10 mg by mouth 2 (two) times a day before meals. , Disp: , Rfl:      hydrALAZINE (APRESOLINE) 25 MG tablet, Take 25 mg by mouth 4 (four) times a day., Disp: , Rfl:      insulin aspart (NOVOLOG) 100 unit/mL injection, Inject under the skin 3 (three) times a day. Per sliding scale, Disp: , Rfl:      insulin glargine (LANTUS) 100 unit/mL injection, Inject 50 Units under the skin 2 (two) times a day. , Disp: , Rfl:      liraglutide (VICTOZA) 0.6 mg/0.1 mL (18 mg/3 mL) PnIj injection, Inject 1.8 mg under the skin daily., Disp: , Rfl:      lisinopril (PRINIVIL,ZESTRIL) 20 MG  tablet, Take 20 mg by mouth 2 (two) times a day., Disp: , Rfl:      oxyCODONE (ROXICODONE) 5 MG immediate release tablet, Take 5 mg by mouth every 4 (four) hours as needed for pain., Disp: , Rfl:      propranolol (INDERAL) 20 MG tablet, Take 20 mg by mouth 2 (two) times a day., Disp: , Rfl:      rivaroxaban 20 mg Tab, Take 20 mg by mouth daily with supper., Disp: , Rfl:      senna-docusate (SENNOSIDES-DOCUSATE SODIUM) 8.6-50 mg tablet, Take 2 tablets by mouth 2 (two) times a day., Disp: , Rfl:   .There were no vitals filed for this visit.  Blood pressure 144/70 pulse 79 respirations 18 saturation room air 99%  Physical Exam   Constitutional: He is oriented to person, place, and time. No distress.   HENT:   Head: Normocephalic.   Eyes:   Neck: Neck supple. No thyromegaly present.   Cardiovascular: Normal rate, regular rhythm and normal heart sounds.    History of A. fib.  Chronic lymphedema.   Pulmonary/Chest: Breath sounds normal.   Obstructive sleep apnea, CPAP   Abdominal: Protuberant there is no tenderness. There is no guarding.   Musculoskeletal:   Generalized weakness   Lymphadenopathy:     He has no cervical adenopathy.   Neurological: He is oriented to person, place, and time.   Skin: Skin is warm and dry. No rash noted.  Left lower extremity resolved of cellulitis     LABS:   none .    ASSESSMENT:      ICD-10-CM    1. Knee pain M25.569    2. Weakness of both lower extremities R29.898    3. Hypertension I10    4. Patella-femoral syndrome M22.2X9        PLAN:    at this time continue to work with the therapy department.  It does look they are probably going to set a discharge date soon I did talk about other alternative and means to help with his ambulation status as well as his therapy component and they will keep me updated if there is any other issues or concerns        Electronically signed by: Michael Duane Johnson, CNP

## 2021-06-13 NOTE — PROGRESS NOTES
[facility]    Facility:   [facility] [[id number]]   Code Status: FULL CODE      CHIEF COMPLAINT/REASON FOR VISIT:  Chief Complaint   Patient presents with     Follow Up     debility, rehab       HISTORY:      HPI: Ta is a 57 y.o. male who I had a chance to revisit with today secondary to his hospitalization in September secondary left lower extremity cellulitis along with generalized weakness and debility.  He no longer has cellulitis left lower extremity he does have lymphedema currently being treated.  He has been normotensive and afebrile.  Blood sugars occasionally erratic but for the most part has been in a box running anywhere from 130 up to 200 but most of them look like to be less than 180.  His appetite is good does use CPAP during the day and night when he is laying in bed.  Otherwise he is able to ambulate apparently about 150 feet with staff encouragement and support using a 2 wheeled walker otherwise he does also have left knee patellofemoral syndrome which is not impeding his therapy at this time I did talk to therapy the end of last week about various interventions and they feel he got it handled at this time and I do not know if he is running into some days yet where he needs to be discharged soon but he is having a care conference tomorrow.    Past Medical History:   Diagnosis Date     A-fib 12/2015     KAITLIN (acute kidney injury)      Allergic rhinitis      Cellulitis of left lower extremity      Depressive disorder      Diabetes mellitus      Dysmetabolic syndrome X      HLD (hyperlipidemia)      Hypertension      Hyponatremia      Morbid obesity      TRUE on CPAP      Sepsis      Streptococcal bacteremia      Tobacco use disorder      Varicose vein of leg              Family History   Problem Relation Age of Onset     CABG Father      Heart disease Father      Coronary artery disease Father      Emphysema Father      Diabetes Mother      Social  History     Social History     Marital status: Single     Spouse name: N/A     Number of children: N/A     Years of education: N/A     Social History Main Topics     Smoking status: Former Smoker     Types: Cigarettes     Quit date: 9/19/2014     Smokeless tobacco: Never Used     Alcohol use No     Drug use: No     Sexual activity: No     Other Topics Concern     Not on file     Social History Narrative         Review of Systems  Currently he denies any chills or fever coughing wheezing chest pain dizziness or vertigo nausea vomiting headaches or stiff neck or rashes.  History of sleep apnea diabetes A. fib hypertension lymphedema          Current Outpatient Prescriptions:      acetaminophen (TYLENOL) 325 MG tablet, Take 650 mg by mouth every 4 (four) hours as needed for pain., Disp: , Rfl:      aspirin 81 mg chewable tablet, Chew 81 mg daily., Disp: , Rfl:      cholecalciferol, vitamin D3, (VITAMIN D3) 1,000 unit capsule, Take 3,000 Units by mouth daily., Disp: , Rfl:      diltiazem (CARDIZEM CD) 180 MG 24 hr capsule, Take 240 mg by mouth daily. , Disp: , Rfl:      flecainide (TAMBOCOR) 50 MG tablet, Take 50 mg by mouth every 12 (twelve) hours., Disp: , Rfl:      furosemide (LASIX) 40 MG tablet, Take 40 mg by mouth 2 (two) times a day., Disp: , Rfl:      glipiZIDE (GLUCOTROL) 10 MG 24 hr tablet, Take 10 mg by mouth 2 (two) times a day before meals. , Disp: , Rfl:      hydrALAZINE (APRESOLINE) 25 MG tablet, Take 25 mg by mouth 4 (four) times a day., Disp: , Rfl:      insulin aspart (NOVOLOG) 100 unit/mL injection, Inject under the skin 3 (three) times a day. Per sliding scale, Disp: , Rfl:      insulin glargine (LANTUS) 100 unit/mL injection, Inject 50 Units under the skin 2 (two) times a day. , Disp: , Rfl:      liraglutide (VICTOZA) 0.6 mg/0.1 mL (18 mg/3 mL) PnIj injection, Inject 1.8 mg under the skin daily., Disp: , Rfl:      lisinopril (PRINIVIL,ZESTRIL) 20 MG tablet, Take 20 mg by mouth 2 (two) times a day.,  Disp: , Rfl:      oxyCODONE (ROXICODONE) 5 MG immediate release tablet, Take 5 mg by mouth every 4 (four) hours as needed for pain., Disp: , Rfl:      propranolol (INDERAL) 20 MG tablet, Take 20 mg by mouth 2 (two) times a day., Disp: , Rfl:      rivaroxaban 20 mg Tab, Take 20 mg by mouth daily with supper., Disp: , Rfl:      senna-docusate (SENNOSIDES-DOCUSATE SODIUM) 8.6-50 mg tablet, Take 2 tablets by mouth 2 (two) times a day., Disp: , Rfl:   .    .There were no vitals filed for this visit.  Blood pressure 138/73 pulse 90 respirations 18 saturation room air 97% temperature 98.5  Physical Exam   Constitutional: He is oriented to person, place, and time. No distress.   HENT:   Head: Normocephalic.   Eyes:   Neck: Neck supple. No thyromegaly present.   Cardiovascular: Normal rate, regular rhythm and normal heart sounds.    History of A. fib.  Chronic lymphedema.   Pulmonary/Chest: Breath sounds normal.   Obstructive sleep apnea, CPAP   Abdominal: Protuberant there is no tenderness. There is no guarding.   Musculoskeletal:   Generalized weakness   Lymphadenopathy:     He has no cervical adenopathy.   Neurological: He is oriented to person, place, and time.   Skin: Skin is warm and dry. No rash noted.  Left lower extremity resolved of cellulitis       LABS:   none    ASSESSMENT:      ICD-10-CM    1. Hypertension I10    2. Lymphedema I89.0    3. TRUE on CPAP G47.33     Z99.89    4. Patella-femoral syndrome M22.2X9        PLAN:    At this time continue to manage and follow does have a care conference scheduled for tomorrow.    .    Electronically signed by: Michael Duane Johnson, CNP

## 2021-06-13 NOTE — PROGRESS NOTES
[facility]      Facility:    [facility] [[id number]]  Code Status: FULL CODE      Chief Complaint/Reason for Visit:  Chief Complaint   Patient presents with     H & P       HPI:   Ta is a 56 y.o. male who was admitted to [facility] on September 17 and transferred to this facility in October 12, 2017.  His past medical history include type 2 diabetes with hemoglobin A1c of 8, obstructive sleep apnea on CPAP, never on home oxygen, hypertension, atrial fibrillation on Xarelto, and morbidly obese.  Has a history of lymphedema which per his report had actually gotten much better in the last several months.  He is on disability because of his Charcot foot.  He does smoke and has been smoking for 35 years now.  At baseline he is independent with his functionality and activities of daily living.  He was admitted because of left lower leg pain swelling redness and flulike symptoms.    He had become septic upon arrival to the ER.  IV antibiotics were initiated.  Blood sugars were high.  Infectious disease    Involved with care.    A blister had formed in the hospital on the posterior left leg that eventually busted and opened up his skin.  I was not able to witness this today.    Apparently blood cultures were drawn but no cultures drawn on the wound.  Blood cultures eventually grew Streptococcus C was placed on vancomycin and gentamicin and later infectious disease transition to clindamycin upon transitioning to TCU.  WBC initially consistently high but trending downwards by the time of discharge.    I saw him currently in bed.  He was in a happy disposition other than pain which she has been requiring oxycodone 5 mg every 4 hours.  He denies any headache or dizziness denies palpitations denies shortness of breath or chest discomfort or pressure.  No abdominal pain no nausea no vomiting he had been placed on a bowel regimen of senna-S2 tablets twice a day and he has been  having increased frequency of stooling.    Past Medical History:  Past Medical History:   Diagnosis Date     A-fib 12/2015     KAITLIN (acute kidney injury)      Allergic rhinitis      Cellulitis of left lower extremity      Depressive disorder      Diabetes mellitus      Dysmetabolic syndrome X      HLD (hyperlipidemia)      Hypertension      Hyponatremia      Morbid obesity      TRUE on CPAP      Sepsis      Streptococcal bacteremia      Tobacco use disorder      Varicose vein of leg            Surgical History:  Past Surgical History:   Procedure Laterality Date     ABCESS DRAINAGE  2010    I&D scrotal abcess        Family History:   Family History   Problem Relation Age of Onset     CABG Father      Heart disease Father      Coronary artery disease Father      Emphysema Father      Diabetes Mother        Social History:    Social History     Social History     Marital status: Single     Spouse name: N/A     Number of children: N/A     Years of education: N/A     Social History Main Topics     Smoking status: Former Smoker     Types: Cigarettes     Quit date: 9/19/2014     Smokeless tobacco: Never Used     Alcohol use No     Drug use: No     Sexual activity: No     Other Topics Concern     Not on file     Social History Narrative          Review of Systems  Otherwise unremarkable  There were no vitals filed for this visit.    Physical Exam  Vital signs noted and stable.  Patient is alert, awake, oriented to time, place and person, not in acute cardiorespiratory distress  Skin: Warm, and moist,  no lesions,   Head: atraumatic, normocephalic,   Eyes: EOM's intact and conjugate, pink palpebral conjunctivae, anicteric sclerae, pupils reactive  Lungs : Clear to auscultation , no crackles, wheezes or rhonchi  Heart : Nornal first and second heart sounds, No murmurs, heaves, or thrills  Abdomen: Soft, non tender, non distended, no hepatosplenomegaly, no ascites  Extremities: Left leg in layers of lymphedema wrap.  I did not  get to see the actual wound today.  However the right leg looks great with not much pitting. pulses are full and equal      Medication List:  Current Outpatient Prescriptions   Medication Sig     acetaminophen (TYLENOL) 325 MG tablet Take 650 mg by mouth every 4 (four) hours as needed for pain.     aspirin 81 mg chewable tablet Chew 81 mg daily.     cholecalciferol, vitamin D3, (VITAMIN D3) 1,000 unit capsule Take 3,000 Units by mouth daily.     diltiazem (CARDIZEM CD) 180 MG 24 hr capsule Take 360 mg by mouth daily.      flecainide (TAMBOCOR) 50 MG tablet Take 50 mg by mouth every 12 (twelve) hours.     furosemide (LASIX) 40 MG tablet Take 40 mg by mouth 2 (two) times a day.     glipiZIDE (GLUCOTROL) 10 MG 24 hr tablet Take 10 mg by mouth 2 (two) times a day before meals.      hydrALAZINE (APRESOLINE) 25 MG tablet Take 25 mg by mouth 4 (four) times a day.     insulin aspart (NOVOLOG) 100 unit/mL injection Inject under the skin 3 (three) times a day. Per sliding scale     insulin glargine (LANTUS) 100 unit/mL injection Inject 50 Units under the skin 2 (two) times a day.      liraglutide (VICTOZA) 0.6 mg/0.1 mL (18 mg/3 mL) PnIj injection Inject 1.8 mg under the skin daily.     lisinopril (PRINIVIL,ZESTRIL) 20 MG tablet Take 20 mg by mouth 2 (two) times a day.     oxyCODONE (ROXICODONE) 5 MG immediate release tablet Take 5 mg by mouth every 4 (four) hours as needed for pain.     propranolol (INDERAL) 20 MG tablet Take 20 mg by mouth 2 (two) times a day.     rivaroxaban 20 mg Tab Take 20 mg by mouth daily with supper.     senna-docusate (SENNOSIDES-DOCUSATE SODIUM) 8.6-50 mg tablet Take 2 tablets by mouth 2 (two) times a day.       Labs:  No results found for this or any previous visit (from the past 72 hour(s)).      Assessment:    ICD-10-CM    1. Left leg cellulitis L03.116    2. DM2 (diabetes mellitus, type 2) E11.9    3. TRUE on CPAP G47.33     Z99.89    4. Hypoxia R09.02    5. HTN (hypertension) I10    6. Diarrhea  R19.7        Plan:  Continue and complete clindamycin 300 mg every 6 hours.  I shall start him on Culturelle 1 tablet 2 times a day for the next 14 days.  Blood sugars are under very good control.  At home he was never on NovoLog he was just giving himself Lantus and Victoza.  Blood sugars have been excellent  Not needing any sliding scale.  I have therefore discontinued NovoLog per carb counting and insulin sliding scale kept so we can calculate his how much short acting insulin which he needs  Continue with other blood sugar regimen  Continue Xarelto for atrial fibrillation.  Continue lisinopril and propranolol for blood pressure control.    Total time spent was 60 minutes with more than 50% spent on counseling, discussion of treatment plan and extensive review of available records  This note has been dictated using voice recognition software. Any grammatical, typographical, or context distortions are unintentional.          Electronically signed by: Juliann Damon MD

## 2021-06-13 NOTE — PROGRESS NOTES
[facility]    Facility:   [facility] [[id number]]   Code Status: FULL CODE      CHIEF COMPLAINT/REASON FOR VISIT:  Chief Complaint   Patient presents with     Follow Up     rehab,       HISTORY:      HPI: Ta is a 57 y.o. male who was seen secondary to his hospitalization in September 2017 secondary left lower extremity cellulitis along with generalized weakness and debility.  The left lower extremity has now resolved his wound does look good.  He does have chronic lymphedema.  He has been normotensive and afebrile.  Does use a CPAP machine while laying in bed.  Also while he has been here and also in the hospital he did hookup oxygen to his CPAP machine but he did not use oxygen at home so no he will qualify.  Is a probable discharge for next week.  His blood sugars are occasionally erratic but do range anywhere from 104 up to 200.  He otherwise has been participating in therapy and does feel pretty good overall about his care as well as the therapy he has received.  He is able to ambulate about 150 feet with staff members.  He did have a care conference earlier this week.    Past Medical History:   Diagnosis Date     A-fib 12/2015     KAITLIN (acute kidney injury)      Allergic rhinitis      Cellulitis of left lower extremity      Depressive disorder      Diabetes mellitus      Dysmetabolic syndrome X      HLD (hyperlipidemia)      Hypertension      Hyponatremia      Morbid obesity      TRUE on CPAP      Sepsis      Streptococcal bacteremia      Tobacco use disorder      Varicose vein of leg              Family History   Problem Relation Age of Onset     CABG Father      Heart disease Father      Coronary artery disease Father      Emphysema Father      Diabetes Mother      Social History     Social History     Marital status: Single     Spouse name: N/A     Number of children: N/A     Years of education: N/A     Social History Main Topics     Smoking status: Former  Smoker     Types: Cigarettes     Quit date: 9/19/2014     Smokeless tobacco: Never Used     Alcohol use No     Drug use: No     Sexual activity: No     Other Topics Concern     Not on file     Social History Narrative         Review of Systems  Currently he denies any chills or fever coughing wheezing chest pain dizziness or vertigo nausea vomiting headaches or stiff neck or rashes.  History of sleep apnea diabetes A. fib hypertension lymphedema    Current Outpatient Prescriptions   Medication Sig     acetaminophen (TYLENOL) 325 MG tablet Take 650 mg by mouth every 4 (four) hours as needed for pain.     aspirin 81 mg chewable tablet Chew 81 mg daily.     cholecalciferol, vitamin D3, (VITAMIN D3) 1,000 unit capsule Take 3,000 Units by mouth daily.     diltiazem (CARDIZEM CD) 180 MG 24 hr capsule Take 240 mg by mouth daily.      flecainide (TAMBOCOR) 50 MG tablet Take 50 mg by mouth every 12 (twelve) hours.     furosemide (LASIX) 40 MG tablet Take 40 mg by mouth 2 (two) times a day.     glipiZIDE (GLUCOTROL) 10 MG 24 hr tablet Take 10 mg by mouth 2 (two) times a day before meals.      hydrALAZINE (APRESOLINE) 25 MG tablet Take 25 mg by mouth 4 (four) times a day.     insulin aspart (NOVOLOG) 100 unit/mL injection Inject under the skin 3 (three) times a day. Per sliding scale     insulin glargine (LANTUS) 100 unit/mL injection Inject 50 Units under the skin 2 (two) times a day.      liraglutide (VICTOZA) 0.6 mg/0.1 mL (18 mg/3 mL) PnIj injection Inject 1.8 mg under the skin daily.     lisinopril (PRINIVIL,ZESTRIL) 20 MG tablet Take 20 mg by mouth 2 (two) times a day.     oxyCODONE (ROXICODONE) 5 MG immediate release tablet Take 5 mg by mouth every 4 (four) hours as needed for pain.     propranolol (INDERAL) 20 MG tablet Take 20 mg by mouth 2 (two) times a day.     rivaroxaban 20 mg Tab Take 20 mg by mouth daily with supper.     senna-docusate (SENNOSIDES-DOCUSATE SODIUM) 8.6-50 mg tablet Take 2 tablets by mouth 2 (two)  times a day.       .There were no vitals filed for this visit.  Blood pressure 135/72 pulse 77 respirations 16 temperature 98.1 saturation room air 97%  Physical Exam    Constitutional: He is oriented to person, place, and time. No distress.   HENT:   Head: Normocephalic.   Eyes:   Neck: Neck supple. No thyromegaly present.   Cardiovascular: Normal rate, regular rhythm and normal heart sounds.    History of A. fib.  Chronic lymphedema.   Pulmonary/Chest: Breath sounds normal.   Obstructive sleep apnea, CPAP   Abdominal: Protuberant there is no tenderness. There is no guarding.   Musculoskeletal:   Generalized weakness   Lymphadenopathy:     He has no cervical adenopathy.   Neurological: He is oriented to person, place, and time.   Skin: Skin is warm and dry. No rash noted.  Left lower extremity resolved of cellulitis        LABS:   No results found for: WBC, HGB, HCT, MCV, PLT  No results found for: HGBA1C  No results found for: [id number]      ASSESSMENT:      ICD-10-CM    1. Weakness of both lower extremities R29.898    2. Diabetes mellitus E11.9    3. TRUE on CPAP G47.33     Z99.89    4. Hypertension I10        PLAN:    At this time continue to manage and follow.  Continue to work with therapy.  Potential discharge for next week.  We did talk about that along with his CPAP machine is appropriate and in apparent use of using oxygen with the CPAP but there is been no documentation of getting that required for home and also he is not asking for a hospital bed which I do not think he has discussed with staff therapy or .    .    Electronically signed by: Michael Duane Johnson, CNP

## 2021-06-13 NOTE — PROGRESS NOTES
[facility]    Facility:   [facility] [[id number]]   Code Status: FULL CODE      CHIEF COMPLAINT/REASON FOR VISIT:  Chief Complaint   Patient presents with     Follow Up     rehab, dm, htn       HISTORY:      HPI: Ta is a 57 y.o. male who I had a chance to revisit with again secondary to his hospitalization September 17 secondary left lower extremity cellulitis with a history of lymphedema obesity generalized weakness and hypertension.  Also does have diabetes.  Blood pressures have been okay they have been high and low recently did decrease the diltiazem 240 mg I think it has been helpful for him he is having less periods of hypotension with standing.  Is also supposed to be in a chair multiple times a day to try to improve his strength and conditioning.  Today I did visit with him and he is eating flat while laying in bed.  He is on room air he does use CPAP at night.  Blood sugars depending on his appetite has been all over the board as well ranging anywhere from 120 all the way up to 238.  Fairly inconsistent depending on his diet.  He otherwise not having any pain he is able ambulate about 70 feet.  His left leg is unremarkable and healed.    Past Medical History:   Diagnosis Date     A-fib 12/2015     KAITLIN (acute kidney injury)      Allergic rhinitis      Cellulitis of left lower extremity      Depressive disorder      Diabetes mellitus      Dysmetabolic syndrome X      HLD (hyperlipidemia)      Hypertension      Hyponatremia      Morbid obesity      TRUE on CPAP      Sepsis      Streptococcal bacteremia      Tobacco use disorder      Varicose vein of leg              Family History   Problem Relation Age of Onset     CABG Father      Heart disease Father      Coronary artery disease Father      Emphysema Father      Diabetes Mother      Social History     Social History     Marital status: Single     Spouse name: N/A     Number of children: N/A     Years of  education: N/A     Social History Main Topics     Smoking status: Former Smoker     Types: Cigarettes     Quit date: 9/19/2014     Smokeless tobacco: Never Used     Alcohol use No     Drug use: No     Sexual activity: No     Other Topics Concern     Not on file     Social History Narrative         Review of Systems  Currently he denies any chills or fever coughing wheezing chest pain dizziness or vertigo nausea vomiting headaches or stiff neck or rashes.  History of sleep apnea diabetes A. fib hypertension lymphedema      Current Outpatient Prescriptions:      acetaminophen (TYLENOL) 325 MG tablet, Take 650 mg by mouth every 4 (four) hours as needed for pain., Disp: , Rfl:      aspirin 81 mg chewable tablet, Chew 81 mg daily., Disp: , Rfl:      cholecalciferol, vitamin D3, (VITAMIN D3) 1,000 unit capsule, Take 3,000 Units by mouth daily., Disp: , Rfl:      diltiazem (CARDIZEM CD) 180 MG 24 hr capsule, Take 240 mg by mouth daily. , Disp: , Rfl:      flecainide (TAMBOCOR) 50 MG tablet, Take 50 mg by mouth every 12 (twelve) hours., Disp: , Rfl:      furosemide (LASIX) 40 MG tablet, Take 40 mg by mouth 2 (two) times a day., Disp: , Rfl:      glipiZIDE (GLUCOTROL) 10 MG 24 hr tablet, Take 10 mg by mouth 2 (two) times a day before meals. , Disp: , Rfl:      hydrALAZINE (APRESOLINE) 25 MG tablet, Take 25 mg by mouth 4 (four) times a day., Disp: , Rfl:      insulin aspart (NOVOLOG) 100 unit/mL injection, Inject under the skin 3 (three) times a day. Per sliding scale, Disp: , Rfl:      insulin glargine (LANTUS) 100 unit/mL injection, Inject 50 Units under the skin 2 (two) times a day. , Disp: , Rfl:      liraglutide (VICTOZA) 0.6 mg/0.1 mL (18 mg/3 mL) PnIj injection, Inject 1.8 mg under the skin daily., Disp: , Rfl:      lisinopril (PRINIVIL,ZESTRIL) 20 MG tablet, Take 20 mg by mouth 2 (two) times a day., Disp: , Rfl:      oxyCODONE (ROXICODONE) 5 MG immediate release tablet, Take 5 mg by mouth every 4 (four) hours as  needed for pain., Disp: , Rfl:      propranolol (INDERAL) 20 MG tablet, Take 20 mg by mouth 2 (two) times a day., Disp: , Rfl:      rivaroxaban 20 mg Tab, Take 20 mg by mouth daily with supper., Disp: , Rfl:      senna-docusate (SENNOSIDES-DOCUSATE SODIUM) 8.6-50 mg tablet, Take 2 tablets by mouth 2 (two) times a day., Disp: , Rfl:     .There were no vitals filed for this visit.  Blood pressure 113/72 pulse 90 respirations 18 saturation room air 92%  Physical Exam  Constitutional: He is oriented to person, place, and time. No distress.   HENT:   Head: Normocephalic.   Eyes:   Neck: Neck supple. No thyromegaly present.   Cardiovascular: Normal rate, regular rhythm and normal heart sounds.    History of A. fib.  Chronic lymphedema.   Pulmonary/Chest: Breath sounds normal.   Obstructive sleep apnea, CPAP   Abdominal: Protuberant there is no tenderness. There is no guarding.   Musculoskeletal:   Generalized weakness   Lymphadenopathy:     He has no cervical adenopathy.   Neurological: He is oriented to person, place, and time.   Skin: Skin is warm and dry. No rash noted.  Left lower extremity resolved of cellulitis    LABS:   No results found for: WBC, HGB, HCT, MCV, PLT  No results found for this or any previous visit.          ASSESSMENT:      ICD-10-CM    1. Weakness of both lower extremities R29.898    2. Lymphedema I89.0    3. Diabetes mellitus E11.9    4. Hypertension I10        PLAN:    At this time no further changes are necessary.  Did encourage his activities and exercise so that he can continue to make some progress also be nice for him to sit in the chair multiple times a day which I asked for a couple weeks ago.  Otherwise he does need to make some significant improvements in therapy.      .    Electronically signed by: Michael Duane Johnson, CNP

## 2021-06-13 NOTE — PROGRESS NOTES
[facility]    Facility:   [facility] [[id number]]   Code Status: FULL CODE      CHIEF COMPLAINT/REASON FOR VISIT:  Chief Complaint   Patient presents with     Review Of Multiple Medical Conditions       HISTORY:      HPI: Ta is a 56 y.o. male seen today in close follow-up of his multiple issues.  He was admitted to the hospital because of the left leg cellulitis.  Physical therapy is doing lymphedema wrapping.  After nursing staff place triad wound paced and covering his left posterior leg wound.  The wound has gotten significantly better as well as the edema.  Still has pain needing oxycodone and acetaminophen.    Blood sugars are great  Not needing insulin sliding scale.  He finished his antibiotics on October 4.  Currently taking Culturelle.  No other symptoms  Past Medical History:   Diagnosis Date     A-fib 12/2015     KAITLIN (acute kidney injury)      Allergic rhinitis      Cellulitis of left lower extremity      Depressive disorder      Diabetes mellitus      Dysmetabolic syndrome X      HLD (hyperlipidemia)      Hypertension      Hyponatremia      Morbid obesity      TRUE on CPAP      Sepsis      Streptococcal bacteremia      Tobacco use disorder      Varicose vein of leg              Family History   Problem Relation Age of Onset     CABG Father      Heart disease Father      Coronary artery disease Father      Emphysema Father      Diabetes Mother      Social History     Social History     Marital status: Single     Spouse name: N/A     Number of children: N/A     Years of education: N/A     Social History Main Topics     Smoking status: Former Smoker     Types: Cigarettes     Quit date: 9/19/2014     Smokeless tobacco: Never Used     Alcohol use No     Drug use: No     Sexual activity: No     Other Topics Concern     Not on file     Social History Narrative         Review of Systems  Unremarkable  .There were no vitals filed for this visit.    Physical  Exam  Vital signs noted.  Weighs 473.3 pounds saturation 95% on 2 L  Patient is alert, awake, oriented to time, place and person, not in acute cardiorespiratory distress  Skin: Warm, and moist,  no lesions,   Head: atraumatic, normocephalic,   Eyes: EOM's intact and conjugate, pink palpebral conjunctivae, anicteric sclerae, pupils reactive  Lungs : Clear to auscultation , no crackles, wheezes or rhonchi  Heart : Nornal first and second heart sounds, No murmurs, heaves, or thrills  Abdomen: Soft, non tender, non distended, no hepatosplenomegaly, no ascites  Extremities: Lymphedema noted, triad wound pasty still thickly adherent to the left posterior leg wound.  We had him take a shower in some of the paste came off.  But I can see that underneath the skin is healthy.  No drainage.  Pulses are full and equal      LABS:   No results found for this or any previous visit (from the past 72 hour(s)).      ASSESSMENT:      ICD-10-CM    1. Left leg cellulitis L03.116    2. DM2 (diabetes mellitus, type 2) E11.9    3. TRUE on CPAP G47.33     Z99.89    4. HTN (hypertension) I10        PLAN:    Continue with current wound care regimen and lymphedema wrapping.  I have discontinued his insulin short acting based on carb counts stay on Lantus 50 units twice daily plus Victoza 1.8 daily and glipizide 10 mg twice daily.  I suspect that he will not need any short acting insulin upon discharge.  Wean oxygen to keep saturations above 92%  Continue with other treatments and therapies    Total 25 minutes of which 55% was spent counseling and coordination of care of the above plan.    Electronically signed by: Juliann Damon MD

## 2021-06-13 NOTE — PROGRESS NOTES
[facility]    Facility:   [facility] [[id number]]   Code Status: FULL CODE      CHIEF COMPLAINT/REASON FOR VISIT:  Chief Complaint   Patient presents with     Follow Up     leg cell, lymphedema, rehab       HISTORY:      HPI: Ta is a 57 y.o. male who I had a chance to visit with secondary to hospitalization September 17, 2017 secondary to left lower extremity cellulitis with a history of lymphedema as well as morbid obesity.  He does have a Charcot foot.  No longer any infection.  Currently enrolled in therapy.  He is making some progress although he does not feel it is faster good enough but he is making some progress with a chance to address that as well as his personal goals as well as what he can personally do in the room when he is not doing therapy otherwise he does land bed remainder of his days when he is not doing therapy.  His appetite is good.  His stools are starting to improve he was on Culturelle.  He will use oxygen as needed.  His blood sugars for the most part less than 180 currently much better improved.  He has been normotensive and afebrile.  His pain is also managed.  He does have left leg weakness along with balance and conditioning issues.  Is able to use a walker but only about 40 feet.    Past Medical History:   Diagnosis Date     A-fib 12/2015     KAITLIN (acute kidney injury)      Allergic rhinitis      Cellulitis of left lower extremity      Depressive disorder      Diabetes mellitus      Dysmetabolic syndrome X      HLD (hyperlipidemia)      Hypertension      Hyponatremia      Morbid obesity      TRUE on CPAP      Sepsis      Streptococcal bacteremia      Tobacco use disorder      Varicose vein of leg              Family History   Problem Relation Age of Onset     CABG Father      Heart disease Father      Coronary artery disease Father      Emphysema Father      Diabetes Mother      Social History     Social History     Marital status: Single      Spouse name: N/A     Number of children: N/A     Years of education: N/A     Social History Main Topics     Smoking status: Former Smoker     Types: Cigarettes     Quit date: 9/19/2014     Smokeless tobacco: Never Used     Alcohol use No     Drug use: No     Sexual activity: No     Other Topics Concern     Not on file     Social History Narrative         Review of Systems  Currently he denies any chills or fever coughing wheezing chest pain dizziness or vertigo nausea vomiting headaches or stiff neck or rashes.  History of sleep apnea diabetes A. fib hypertension lymphedema    Current Outpatient Prescriptions   Medication Sig     acetaminophen (TYLENOL) 325 MG tablet Take 650 mg by mouth every 4 (four) hours as needed for pain.     aspirin 81 mg chewable tablet Chew 81 mg daily.     cholecalciferol, vitamin D3, (VITAMIN D3) 1,000 unit capsule Take 3,000 Units by mouth daily.     diltiazem (CARDIZEM CD) 180 MG 24 hr capsule Take 360 mg by mouth daily.      flecainide (TAMBOCOR) 50 MG tablet Take 50 mg by mouth every 12 (twelve) hours.     furosemide (LASIX) 40 MG tablet Take 40 mg by mouth 2 (two) times a day.     glipiZIDE (GLUCOTROL) 10 MG 24 hr tablet Take 10 mg by mouth 2 (two) times a day before meals.      hydrALAZINE (APRESOLINE) 25 MG tablet Take 25 mg by mouth 4 (four) times a day.     insulin aspart (NOVOLOG) 100 unit/mL injection Inject under the skin 3 (three) times a day. Per sliding scale     insulin glargine (LANTUS) 100 unit/mL injection Inject 50 Units under the skin 2 (two) times a day.      liraglutide (VICTOZA) 0.6 mg/0.1 mL (18 mg/3 mL) PnIj injection Inject 1.8 mg under the skin daily.     lisinopril (PRINIVIL,ZESTRIL) 20 MG tablet Take 20 mg by mouth 2 (two) times a day.     oxyCODONE (ROXICODONE) 5 MG immediate release tablet Take 5 mg by mouth every 4 (four) hours as needed for pain.     propranolol (INDERAL) 20 MG tablet Take 20 mg by mouth 2 (two) times a day.     rivaroxaban 20 mg Tab  Take 20 mg by mouth daily with supper.     senna-docusate (SENNOSIDES-DOCUSATE SODIUM) 8.6-50 mg tablet Take 2 tablets by mouth 2 (two) times a day.       .There were no vitals filed for this visit.  Blood pressure 148/79 pulse 90 respirations 20 temperature 98.8 saturation room air 94%  Physical Exam   Constitutional: He is oriented to person, place, and time. No distress.   HENT:   Head: Normocephalic.   Eyes: Pupils are equal, round, and reactive to light.   Neck: Neck supple. No thyromegaly present.   Cardiovascular: Normal rate, regular rhythm and normal heart sounds.    History of A. fib.  Chronic lymphedema.   Pulmonary/Chest: Breath sounds normal.   Obstructive sleep apnea, CPAP   Abdominal: Bowel sounds are normal. There is no tenderness. There is no guarding.   Musculoskeletal:   Generalized weakness   Lymphadenopathy:     He has no cervical adenopathy.   Neurological: He is oriented to person, place, and time.   Skin: Skin is warm and dry. No rash noted.         LABS:   No results found for: HGBA1C  No results found for: WBC, HGB, HCT, MCV, PLT  No results found for this or any previous visit.          ASSESSMENT:      ICD-10-CM    1. Lymphedema I89.0    2. Leg weakness R29.898    3. Balance disorder R26.89    4. Diabetes mellitus E11.9    5. Hypertension I10    6. TRUE on CPAP G47.33     Z99.89    7. A-fib I48.91        PLAN:    No other changes at this time.  Continue to manage and follow.  Did encourage his therapy as well as his exercise plan including performing more exercises with therapy is actually asking of him at this time.  He agrees with the current plan of care.    .    Electronically signed by: Michael Duane Johnson, CNP

## 2021-06-14 NOTE — PROGRESS NOTES
[facility]    Facility:   [facility] [[id number]]   Code Status: FULL CODE  PCP: Amelia Pepper MD   Phone: [phone]   Fax: [phone]      CHIEF COMPLAINT/REASON FOR VISIT:  Chief Complaint   Patient presents with     Discharge Summary       HISTORY COURSE:  Ta is a 56 y.o. male who was admitted to [facility] on September 17 and transferred to this facility in October 12, 2017.  His past medical history include type 2 diabetes with hemoglobin A1c of 8, obstructive sleep apnea on CPAP, never on home oxygen, hypertension, atrial fibrillation on Xarelto, and morbidly obese.  Has a history of lymphedema which per his report had actually gotten much better in the last several months.  He is on disability because of his Charcot foot.  He does smoke and has been smoking for 35 years now.  At baseline he is independent with his functionality and activities of daily living.  He was admitted because of left lower leg pain swelling redness and flulike symptoms.     He had become septic upon arrival to the ER.  IV antibiotics were initiated.    A blister had formed in the hospital on the posterior left leg that eventually busted and opened up his skin.  I was not able to witness this today.     Apparently blood cultures were drawn but no cultures drawn on the wound.  Blood cultures eventually grew Streptococcus C was placed on vancomycin and gentamicin and later infectious disease transition to clindamycin upon transitioning to TCU.  WBC initially consistently high but trending downwards by the time of discharge.  He has been able to participate with rehabilitation services.  He initially was extremely weak unable to really tolerate any form of exercise including ambulation to which now with a walker and encouragement he is able to go over 150 feet.  He is not requiring any oxygen.  He does use a CPAP machine at night.  He has been normotensive and afebrile.  His  pain has been well managed.  His appetite good.  Blood sugars erratic at times but for the most part fairly well controlled for him running anywhere from 150-250.  For his A. fib he is on Xarelto along with other blood pressure medications.  For periodic pain and as needed he does have oxycodone.  The left leg wound did heal up nicely without any sequela of further problems.    Review of Systems  Currently he denies any chills or fever coughing wheezing chest pain dizziness or vertigo nausea vomiting headaches or stiff neck or rashes.  History of sleep apnea diabetes A. fib hypertension lymphedema  Vitals:    11/07/17 1441   BP: 110/77   Pulse: 91   Resp: 18   Temp: 98.1  F (36.7  C)   SpO2: 94%       Physical Exam    Constitutional: He is oriented to person, place, and time. No distress.   HENT:   Head: Normocephalic.   Eyes:   Neck: Neck supple. No thyromegaly present.   Cardiovascular: Normal rate, regular rhythm and normal heart sounds.    History of A. fib.  Chronic lymphedema.   Pulmonary/Chest: Breath sounds normal.   Obstructive sleep apnea, CPAP   Abdominal: Protuberant there is no tenderness. There is no guarding.   Musculoskeletal:   Generalized weakness   Lymphadenopathy:     He has no cervical adenopathy.   Neurological: He is oriented to person, place, and time.   Skin: Skin is warm and dry. No rash noted.  Left lower extremity resolved of cellulitis          No results found for: HGBA1C  No results found for: WBC, HGB, HCT, MCV, PLT  No results found for this or any previous visit.        MEDICATION LIST:  Current Outpatient Prescriptions   Medication Sig     acetaminophen (TYLENOL) 325 MG tablet Take 650 mg by mouth every 4 (four) hours as needed for pain.     aspirin 81 mg chewable tablet Chew 81 mg daily.     cholecalciferol, vitamin D3, (VITAMIN D3) 1,000 unit capsule Take 3,000 Units by mouth daily.     diltiazem (CARDIZEM CD) 180 MG 24 hr capsule Take 240 mg by mouth daily.      flecainide  (TAMBOCOR) 50 MG tablet Take 50 mg by mouth every 12 (twelve) hours.     furosemide (LASIX) 40 MG tablet Take 40 mg by mouth 2 (two) times a day.     glipiZIDE (GLUCOTROL) 10 MG 24 hr tablet Take 10 mg by mouth 2 (two) times a day before meals.      insulin aspart (NOVOLOG) 100 unit/mL injection Inject under the skin 3 (three) times a day. Per sliding scale     insulin glargine (LANTUS) 100 unit/mL injection Inject 50 Units under the skin 2 (two) times a day.      liraglutide (VICTOZA) 0.6 mg/0.1 mL (18 mg/3 mL) PnIj injection Inject 1.8 mg under the skin daily.     lisinopril (PRINIVIL,ZESTRIL) 20 MG tablet Take 20 mg by mouth 2 (two) times a day.     oxyCODONE (ROXICODONE) 5 MG immediate release tablet Take 5 mg by mouth every 4 (four) hours as needed for pain.     propranolol (INDERAL) 20 MG tablet Take 20 mg by mouth 2 (two) times a day.     rivaroxaban 20 mg Tab Take 20 mg by mouth daily with supper.     senna-docusate (SENNOSIDES-DOCUSATE SODIUM) 8.6-50 mg tablet Take 2 tablets by mouth 2 (two) times a day.       DISCHARGE DIAGNOSIS:    ICD-10-CM    1. Hypertension I10    2. Weakness of both lower extremities R29.898    3. Lymphedema I89.0    4. Balance disorder R26.89        MEDICAL EQUIPMENT NEEDS:  Walker.  CPAP.  Hospital bed.    DISCHARGE PLAN/FACE TO FACE:  I certify that services are/were furnished while this patient was under the care of a physician and that a physician or an allowed non-physician practitioner (NPP), had a face-to-face encounter that meets the physician face-to-face encounter requirements. The encounter was in whole, or in part, related to the primary reason for home health. The patient is confined to his/her home and needs intermittent skilled nursing, physical therapy, speech-language pathology, or the continued need for occupational therapy. A plan of care has been established by a physician and is periodically reviewed by a physician.  Date of Face-to-Face Encounter: November 7,  2017    I certify that, based on my findings, the following services are medically necessary home health services: He will be discharging home with current medications and narcotics with [facility] for physical and occupational therapy home health aide and nursing.    My clinical findings support the need for the above skilled services because: (Please write a brief narrative summary that describes what the RN, PT, SLP, or other services will be doing in the home. A list of diagnoses in this section does not meet the CMS requirements.)  Secondary to generalized debility chronic medical conditions and medication management    This patient is homebound because: (Please write a brief narrative summary describing the functional limitations as to why this patient is homebound and specifically what makes this patient homebound.)  Secondary to multiple chronic comorbid conditions    The patient is, or has been, under my care and I have initiated the establishment of the plan of care. This patient will be followed by a physician who will periodically review the plan of care.  He will follow-up with his primary care doctor regarding diabetes any future laboratory studies and also does follow-up with cardiology approximately once per year.  He did not have any further questions problems or concerns regarding his stay on the transitional care unit or with any questions regarding his discharge.  He does feel comfortable and does feel like he can manage at home.    Upon discharge she will need a bariatric semi-electric hospital bed due to E 66.9.  G 47.33 and I 89.0.  He does require positioning of the body in ways that original bed cannot along with the head of the bed being elevated due to sleep apnea.  He needs to bed at a height difference to help with transfers secondary to his obesity lymphedema generalized weakness.  He will need a bed height elevated more than 30  most the time due to sleep apnea and  hypertension and lymphedema.  He will need this for lifetime.    Discharge coordination of care greater than 30 minutes.    Electronically signed by: Michael Duane Johnson, CNP

## 2021-06-16 PROBLEM — E11.9 DIABETES MELLITUS (H): Status: ACTIVE | Noted: 2017-10-17

## 2021-06-16 PROBLEM — M22.2X9 PATELLA-FEMORAL SYNDROME: Status: ACTIVE | Noted: 2017-10-27

## 2021-06-16 PROBLEM — E11.42 DIABETIC PERIPHERAL NEUROPATHY ASSOCIATED WITH TYPE 2 DIABETES MELLITUS (H): Status: ACTIVE | Noted: 2018-04-30

## 2021-06-16 PROBLEM — G47.33 OSA ON CPAP: Status: ACTIVE | Noted: 2017-10-17

## 2021-06-16 PROBLEM — R29.898 LEG WEAKNESS: Status: ACTIVE | Noted: 2017-10-17

## 2021-06-16 PROBLEM — R26.89 BALANCE DISORDER: Status: ACTIVE | Noted: 2017-10-17

## 2021-06-16 PROBLEM — M79.605 PAIN IN BOTH LOWER EXTREMITIES: Status: ACTIVE | Noted: 2018-04-30

## 2021-06-16 PROBLEM — E66.01 MORBID OBESITY WITH BMI OF 50.0-59.9, ADULT (H): Status: ACTIVE | Noted: 2018-04-30

## 2021-06-16 PROBLEM — I10 HYPERTENSION: Status: ACTIVE | Noted: 2017-10-17

## 2021-06-16 PROBLEM — I87.2 VENOUS INSUFFICIENCY OF BOTH LOWER EXTREMITIES: Status: ACTIVE | Noted: 2018-04-30

## 2021-06-16 PROBLEM — L90.5 SCAR CONDITION AND FIBROSIS OF SKIN: Status: ACTIVE | Noted: 2018-04-30

## 2021-06-16 PROBLEM — M79.89 LEG SWELLING: Status: ACTIVE | Noted: 2018-04-30

## 2021-06-16 PROBLEM — I48.91 A-FIB (H): Status: ACTIVE | Noted: 2017-10-17

## 2021-06-16 PROBLEM — M79.604 PAIN IN BOTH LOWER EXTREMITIES: Status: ACTIVE | Noted: 2018-04-30

## 2021-06-16 PROBLEM — L97.223: Status: ACTIVE | Noted: 2018-05-30

## 2021-06-16 PROBLEM — M14.671 CHARCOT'S JOINT, RIGHT ANKLE AND FOOT: Status: ACTIVE | Noted: 2018-04-30

## 2021-06-16 PROBLEM — I89.0 ACQUIRED LYMPHEDEMA OF LEG: Status: ACTIVE | Noted: 2018-04-30

## 2021-06-17 NOTE — PROGRESS NOTES
Left leg swelling/phlebitis 3/18 US done.9/17/17 [facility]  left leg cellulitis and septic. Then transition call. Down 100lbs since hospitalized.Patient said has charcot joint on right foot.

## 2021-06-17 NOTE — PROGRESS NOTES
Referred By: Amelia Pepper MD    Date Of Service: 04/30/2018    Chief Complaint: Bilateral leg swelling, left leg much worse    History of Present Illness:    Ta Mir presents to the [facility] as a new consult to evaluate Bilateral leg swelling and mainly on left leg.  The swelling began about 10 years ago.  The swelling wound be there in his legs and than go down. About a year ago he had more leg and lower abdominal swelling and increased diuretics helped. In September 2017 he developed left leg cellulitis and was hospitalized at [facility] for wounds, drainage and swelling.  He had bandaging and lymphedema wraps.  This helped.  He wasn't walking but was in bed with legs up.  He eventually got home with compression socks, but he couldn't get them on.  There was no associated trauma, medication change or major illness.  The swelling has worsened since onset.  Pain is rated a 2 on a 10 point scale.  Pain is described as achey.  He is now having some pain on the back on the left heel.  The swelling is improved with elevation.  It does not resolved.   The patient sleeps in a bed.  History is complicated by known Type II Diabetes with peripheral neuropathy, Venous insufficiency/stasis and Morbid obesity.  He had I&D of a left scrotal abscess in 2010. There has been no new numbness, tingling or weakness.  There have been no new masses, rashes, or swellings of any other joints. There has been no new decrease in appetite, unexplained weight loss, abdominal bloating and bowel or bladder changes.  He has always been very heavy.      Past Medical History:   Diagnosis Date     A-fib 12/2015     KAITLIN (acute kidney injury)      Allergic rhinitis      Cellulitis of left lower extremity      Depressive disorder      Diabetes mellitus      Dysmetabolic syndrome X      HLD (hyperlipidemia)      Hypertension      Hyponatremia      Morbid obesity      TRUE on CPAP      Sepsis       Streptococcal bacteremia      Tobacco use disorder      Varicose vein of leg        Past Surgical History:   Procedure Laterality Date     ABCESS DRAINAGE  2010    I&D scrotal abcess          Current Outpatient Prescriptions:      acetaminophen (TYLENOL) 325 MG tablet, Take 650 mg by mouth every 4 (four) hours as needed for pain., Disp: , Rfl:      aspirin 81 mg chewable tablet, Chew 81 mg daily., Disp: , Rfl:      cholecalciferol, vitamin D3, (VITAMIN D3) 1,000 unit capsule, Take 3,000 Units by mouth daily., Disp: , Rfl:      diltiazem (CARDIZEM CD) 180 MG 24 hr capsule, Take 240 mg by mouth daily. , Disp: , Rfl:      flecainide (TAMBOCOR) 50 MG tablet, Take 50 mg by mouth every 12 (twelve) hours., Disp: , Rfl:      furosemide (LASIX) 40 MG tablet, Take 40 mg by mouth 2 (two) times a day., Disp: , Rfl:      glipiZIDE (GLUCOTROL) 10 MG 24 hr tablet, Take 10 mg by mouth 2 (two) times a day before meals. , Disp: , Rfl:      insulin glargine (LANTUS) 100 unit/mL injection, Inject 50 Units under the skin 2 (two) times a day. , Disp: , Rfl:      liraglutide (VICTOZA) 0.6 mg/0.1 mL (18 mg/3 mL) PnIj injection, Inject 1.8 mg under the skin daily., Disp: , Rfl:      lisinopril (PRINIVIL,ZESTRIL) 20 MG tablet, Take 20 mg by mouth 2 (two) times a day., Disp: , Rfl:      propranolol (INDERAL) 20 MG tablet, Take 20 mg by mouth 2 (two) times a day., Disp: , Rfl:      rivaroxaban 20 mg Tab, Take 20 mg by mouth daily with supper., Disp: , Rfl:     Allergies   Allergen Reactions     Ampicillin      Metformin Nausea Only     Mold      Ciprofloxacin Palpitations       Social History     Social History     Marital status: Single     Spouse name: N/A     Number of children: N/A     Years of education: N/A     Occupational History     Not on file.     Social History Main Topics     Smoking status: Former Smoker     Packs/day: 3.00     Years: 35.00     Types: Cigarettes     Quit date: 9/19/2014     Smokeless tobacco: Never Used      Alcohol use No     Drug use: No     Sexual activity: No     Other Topics Concern     Not on file     Social History Narrative    On disability for charcot foot 8/2015.  Single.  No kids.  On disability. Former .        Family History   Problem Relation Age of Onset     CABG Father      Heart disease Father      Coronary artery disease Father      Emphysema Father      Diabetes Mother        Review of Systems:  Review of systems is otherwise negative, except as noted above.  Full 12 point review of systems was completed.    Radiology/Diagnostic Studies:    I personally reviewed the following imaging today and those on care everywhere, if indicated    SALLY SHAFFER   VENOUS LOWER EXTREMITY LEFT  3/1/2018 3:50 PM    INDICATION: Diabetes Mellitus Without Complication (hc)  TECHNIQUE: Routine exam without and with compression, augmentation, and duplex  utilizing 2D gray-scale imaging, Doppler interrogation with color-flow and  spectral waveform analysis.  COMPARISON: 09/17/2017    FINDINGS: The common femoral, deep femoral and proximal superficial femoral  veins are evaluated. The distal half of the thigh, the popliteal and tibial  veins could not be assessed due to body mass and edema. There are no findings of  deep venous thrombosis in the proximal veins listed above. There appear to be  superficial thromboses within the medial varicosities at the level of the knee.    CONCLUSION:  1.  Positive for superficial thrombosis in varicosities in the swollen medial  left leg.    2.  Indeterminate assessment of the leg due to body habitus and swelling from  the mid thigh to the ankle.    3.  The common femoral, proximal femoral and profunda femoral appear patent on  the left.    4.  The right common femoral is negative.    Result Narrative   XR KNEE 2 VIEWS LEFT PORTABLE  10/2/2017 12:26 PM    INDICATION: Left knee pain.  COMPARISON: None.    FINDINGS: Single portable AP view of the left knee shows mild  osteoarthritis in the patellofemoral compartments with joint space narrowing and marginal spurring most marked medially. No fracture on the single image identified.       Laboratory Studies:  I personally reviewed the following labs today and those on care everywhere, if indicated    5/23/17  Alb  3.5    12/21/15 TSH 2.08  9/23/17  CRP  14.3  9/27/18  BUN 16  3/22/18  Cr 1.08   HgA1c  6  Glucose ran  98    Physical Exam:  Vitals:    04/30/18 1031   BP: 140/70   Pulse: 88   Resp: 14   Temp: 98.8  F (37.1  C)     BMI 54.31    See flow chart for circumferential measures.    Vasc Edema 4/30/2018   Right just above MTP 32.0   Right Ankle 34.5   Right Widest Calf 51.0   Right Thigh Up 10cm 75.0   Left - just above MTP 30.4   Left Ankle 33.0   Left Widest Calf 71.5   Left Thigh Up 10cm 80.5       General:  57 y.o. male in no apparent distress.      Psych: Alert and oriented x 3.  Cooperative. Affect normal.    Respiratory:  Lungs are clear to auscultation throughout with full inspiration    Cardiovascular: Normal S1, S2 without murmur, gallop or rub.  No audible carotid bruits. Regular rhythm.     Abdominal: Normal bowel sounds without any pain, guarding or rigidity. No inguinal lymphadenopathy palpated.  Greatly compromised due to morbid obesity.      Musculoskeletal:  Normal range of motion in hips, knees bilaterally.   Right foot Charcot with poor range of motion of the right ankle.  There is no active joint synovitis, erythema, swelling or joint laxity.     Neurological:  Sensation is decreased to pin prick and light touch in a stocking distribution.  Strength testing is normal in hip flexion, knee flexion, knee extension, ankle dorsiflexion, with slight decrease in great toe extension bilaterally. Deep tendon reflexes, knee jerks and ankle jerks, are not present.      Vascular: Dorsalis pedis and posterior tibialis pulses are decreased bilaterally. Not clear with doppler testing.  There are moderate telangietasias,  medial ankle venous flares and spider veins . There is normal capillary refill.     Integumentary: Skin of the legs is uniformly warm and dry, and hyperpigmentated, with hyperkeratosis and keratoderma and with positive Stemmer's Sign .  Nails are thickened, thin surrounding skin, decreased hair growth on toes and discolored      Impression:  1.  Lower extremity bilaterally much more so on the left leg  2.  Significant fibrosis and scarring of the skin especially on the left distal leg with champagne leg  3.  Type 2 diabetes with peripheral neuropathy  4.  Morbid obesity  5.  Right foot Charcot joint  6.  Gait impairment due to above    Plan:  1. Type of swelling was reviewed in detail with the patient.  Questions were answered and education was completed.  2.  With his history of diabetes, swelling, infection and long history of tobacco abuse, I will get ABIs to assess arterial status.  3.  The weight is causing significant problems.  It is a major contributor to his diabetes, knee pain, problems walking, venous hypertension and insufficiency, leg swelling, increased risk of infection along with multiple other high risk factors.  After discussion he agrees bariatric referral would be in order.  This was written for him.  4.  I had like him to get to therapy to see if we can get some of the swelling down especially so he can get back in his shoes.  This was ordered.  Once the swelling is down I am going to have him check his compression garments.  In the end he may do much better with the Velcro type compression.  He is having a hard time doffing in donning the compression stockings that he has.  5.  We had long a long discussion about diabetic foot care and watching his feet regularly.  In addition, he needs to always be using a hard soled shoe.  We discussed the increased risks of amputation.  Again, hopefully with getting the swelling down we can get him back into his diabetic shoes with insoles.  6. Patient will  follow up in 3 months, or when needed.     Thank you very much for this consult.  If you have any questions please feel free to contact me at [phone].    Time spent with patient 60 minutes with greater than 50% time in consultation, education and coordination of care.     Gracie Arteaga MD, ABWMS, FACCWS, [facility]  Medical Director Wound Care and Lymphedema  [facility]  [phone]

## 2021-06-18 NOTE — PROGRESS NOTES
[facility] Daily Progress     Patient Name: Ta Mir  Date: 5/24/2018  Visit #: 4  PTA visit #: 2  Referral Diagnosis:   Swelling of left extremity [M79.89]  - Primary       Leg swelling [M79.89]       Venous insufficiency of both lower extremities [I87.2]       Scar condition and fibrosis of skin [L90.5]       Charcot's joint, right ankle and foot [M14.671]       Acquired lymphedema of leg [I89.0]       Diabetic peripheral neuropathy associated with type 2 diabetes mellitus [E11.42]       Morbid obesity with BMI of 50.0-59.9, adult [E66.01, Z68.43]       Pain in both lower extremities [M79.604, M79.605]       Referring provider: Gracie Arteaga, *  Visit Diagnosis:     ICD-10-CM    1. Swelling of left extremity M79.89    2. Leg swelling M79.89    3. Venous insufficiency of both lower extremities I87.2    4. Scar condition and fibrosis of skin L90.5      Assessment:     No wound dressing on left medial calf when pt arrived to PT. Wound weeping.  Pt took bandages off this morning just prior to his appointment (about 2.5 hours).   At this time, patient does have further visits scheduled in PT 3x/wk for the next 2 weeks. We will continue to wrap, exercises and MLD when time allows. He has been encouraged to f/u with Dr. Arteaga (or one of the nurses at vascular) to discuss his left medial calf wound. He states it wasn't there when he first saw Dr. Arteaga and it still is present. We would like him to discuss this with vascular in case there are other dressings/wound treatments he should be doing.   Will forward this note to Dr. Arteaga today to inform her. Patient will call vascular to discuss.     Goal Status: On going  Pt. will demonstrate/verbalize independence in self-management of condition in : 12 weeks  Pt. will be independent with home exercise program in : 12 weeks  Pt will: reduce Left Leg volume by 10% in 6 weeks  Pt will: improve walking tolerance and tolerate stairs in  order to sleep in his own bed in 6 weeks    Plan / Patient Education:     Continue with wrapping, modify as needed. Focus on exercises in clinic (patient pretty sedentary). Suggested that patient call to schedule with Dr. Arteaga for the left medial calf wound. Seems to be about the same since initial visit and swelling is still significant.    Subjective:     He has had the wraps on since 2 days ago applied in clinic. He took them off 2.5 hours ago to shower and get to appt. He hasn't had his mother wrap him at home yet, just kept them on since previous appts.   Doing exercises 1x/day right now.     Objective:     Caregiver present: No    Observation of swelling: right LE is mildly improved, left LE is possibly improved, but didn't remeasure today (will remeasure at next session). Wraps taken off 2.5 hours ago to shower this morning.     Volume measurements taken:  No    Skin condition is:  Dry, wound on L posterior lower leg, some weeping noted  Compression: Took bandages off this morning at 10:00   to shower    Medication for infection:  No    Patient signed ABN for the bandaging supplies: yes.     Provided 4 rolls of comprilan, 2 rolls of Rosidal.     Treatment Today     TREATMENT MINUTES COMMENTS   Evaluation     Self-care/ Home management 30 Re dressed wound L LE with ABD and affixed with rosidal foam to keep it from sliding down.     Medical compression bandages B LEs:  Applied Eucerin lotion  Size A7 Tricofix R and ortho stockinette on L, 1 roll Rosidal on R LE and 2 on L LE to fill in lower leg to make it more uniform shape. 1 roll 8 x 5 Comprilan B, 1 roll 12 x 5 Comprilan B, 1 roll 12 x 10 Comprilan B.  Tube K on L and comperm G on R   Pt advised to re wrap if bandages are sliding down. Patient should have mom wrap him this weekend and possibly Monday as well.    Manual therapy     Neuromuscular Re-education     Therapeutic Activity     Therapeutic Exercises 15 Reviewed lymph exercises - focused on  standing exercises in gym today (see ex flowsheet) and ed on having patient keep up with these exercises 3 times per day (currently only doing 1x/day) and is sedentary most of the time.   Gait training     Modality__________________                Total 45    Blank areas are intentional and mean the treatment did not include these items.       Jacklyn Mcfarlane, DPT, CLT  5/24/2018

## 2021-06-18 NOTE — PROGRESS NOTES
[facility] Daily Progress     Patient Name: Ta Mir  Date: 6/1/2018  Visit #: 6  PTA visit #: 4  Referral Diagnosis:   Swelling of left extremity [M79.89]  - Primary       Leg swelling [M79.89]       Venous insufficiency of both lower extremities [I87.2]       Scar condition and fibrosis of skin [L90.5]       Charcot's joint, right ankle and foot [M14.671]       Acquired lymphedema of leg [I89.0]       Diabetic peripheral neuropathy associated with type 2 diabetes mellitus [E11.42]       Morbid obesity with BMI of 50.0-59.9, adult [E66.01, Z68.43]       Pain in both lower extremities [M79.604, M79.605]       Referring provider: Gracie Arteaga, *  Visit Diagnosis:     ICD-10-CM    1. Swelling of left extremity M79.89    2. Leg swelling M79.89    3. Venous insufficiency of both lower extremities I87.2    4. Scar condition and fibrosis of skin L90.5    5. Charcot's joint, right ankle and foot M14.671    6. Acquired lymphedema of leg I89.0    7. Diabetic peripheral neuropathy associated with type 2 diabetes mellitus E11.42    8. Morbid obesity with BMI of 50.0-59.9, adult E66.01     Z68.43    9. Pain in both lower extremities M79.604     M79.605      Assessment:       Pt took bandages off this morning just prior to his appointment (about 2.5 hours).   At this time, patient does have further visits scheduled in PT 3x/wk for the next 2 weeks. We will continue to wrap, exercises and MLD when time allows.      Goal Status: On going  Pt. will demonstrate/verbalize independence in self-management of condition in : 12 weeks  Pt. will be independent with home exercise program in : 12 weeks  Pt will: reduce Left Leg volume by 10% in 6 weeks  Pt will: improve walking tolerance and tolerate stairs in order to sleep in his own bed in 6 weeks    Plan / Patient Education:     Continue with wrapping, wound care modify as needed. Focus on exercises in clinic (patient pretty sedentary).  Seems to be about  "the same since initial visit and swelling is still significant.  Pt given order for Velcro garment with instructions to schedule fitting in 2 weeks.  Pt will follow up at vascular center in 2 weeks.  Subjective:   Pt reports he saw Dr. Arteaga on Wednesday. \" There's a hole in my leg.\" \"It's a 1 inch hole there.\"  Pt states the nurse at the vascular center re wrapped after the visit.  Pt states the bandages from his last appointment here stayed up.  Pt brought in wound care products.        Objective:     Caregiver present: No    Observation of swelling: per pt the R LE is more swollen    Volume measurements taken:  no    Skin condition is:  Dry, wound on L posterior lower leg. Per pt wound size=11/2\"  Compression: none       Medication for infection:  No    Patient signed ABN for the bandaging supplies: yes.     Provided 4 rolls of comprilan, 2 rolls of Rosidal.   Lymphedema Assessment 5/16/2018 5/30/2018   Right Lower Extremity Total Estimated Volume (cm3) 9872.44 9840.46   Right LE change of volume from last visit (%) - -0.32   Right LE change of volume from initial (%) - -0.32   Left Lower Extremity Total Estimated Volume (cm3) 59913.43 09870.65   Left LE change of volume from last visit (%) - 2.16   Left LE change of volume from initial (%) - 2.16   Swelling Description Left>Right Left>Right   Lower Extremity Swelling Comparison (%) 36.32 39.73       Treatment Today     TREATMENT MINUTES COMMENTS   Evaluation     Self-care/ Home management 55 Wound care as follows:  1) Silvercel Alginate packed in wound  2) covered with Allyvn adhesive bandage       Medical compression bandages B LEs:  Applied Eucerin lotion  Size A7 Tricofix R and ortho stockinette on L, 1 roll Rosidal on R LE and 2 on L LE to fill in lower leg to make it more uniform shape. 1 roll 8 x 5 Comprilan B, 1 roll 12 x 5 Comprilan B, 1 roll 12 x 10 Comprilan B.  Tube K on L and comperm G on R   Pt advised to re wrap if bandages are sliding down.  "   Manual therapy     Neuromuscular Re-education     Therapeutic Activity     Therapeutic Exercises  Reviewed lymph exercises -   Standing ex's:  -hip AB x12 B  -hip extension x 10 B  -marching x10  -mini squats x10  -heel/toe raises x10   Gait training     Modality__________________                Total 55    Blank areas are intentional and mean the treatment did not include these items.       Ni Rogers, PTA,CLT  6/1/2018

## 2021-06-18 NOTE — PROGRESS NOTES
"[facility] Daily Progress     Patient Name: Ta Mir  Date: 5/18/2018  Visit #: 2  PTA visit #: 1  Referral Diagnosis: [unfilled]  Referring provider: Gracie Arteaga, *  Visit Diagnosis:     ICD-10-CM    1. Swelling of left extremity M79.89    2. Leg swelling M79.89    3. Venous insufficiency of both lower extremities I87.2    4. Scar condition and fibrosis of skin L90.5    5. Charcot's joint, right ankle and foot M14.671    6. Acquired lymphedema of leg I89.0    7. Diabetic peripheral neuropathy associated with type 2 diabetes mellitus E11.42    8. Morbid obesity with BMI of 50.0-59.9, adult E66.01     Z68.43    9. Pain in both lower extremities M79.604     M79.605          Assessment:   Pt was seen today for his first follow up visit.  Pt took bandages off this morning just prior to his appointment.  Some blood/drainage on bandages.   Pt's mother observed bandaging today and will attempt to re wrap over the weekend.  Patient is benefitting from skilled physical therapy and is making steady progress toward functional goals.  Patient is appropriate to continue with skilled physical therapy intervention, as indicated by initial plan of care.    Goal Status: On going  Pt. will demonstrate/verbalize independence in self-management of condition in : 12 weeks  Pt. will be independent with home exercise program in : 12 weeks  Pt will: reduce Left Leg volume by 10% in 6 weeks  Pt will: improve walking tolerance and tolerate stairs in order to sleep in his own bed in 6 weeks    Plan / Patient Education:     Continue with initial plan of care.  Progress with home program as tolerated.   Go over lymph exercises.  Modify bandages as needed.     Subjective:   Pt reports his legs itch.   \"The sore is still open\"  Pt could not find his other bandages at home.        Objective:   Caregiver present: Mother, Miryam    Observation of swelling: unchanged     Volume measurements taken:  No      Skin " condition is:  Dry, wound on L posterior lower leg, some drainage onto bandages  Compression: Took bandages off this morning at 9:00 to shower      Medication for infection:  No    Patient signed ABN for the bandaging supplies: yes.     Provided 4 rolls of comprilan, 2 rolls of Rosidal.     Treatment Today     TREATMENT MINUTES COMMENTS   Evaluation     Self-care/ Home management 40 Re dressed wound with ABD and Kerlix  Medical compression bandages B LEs:  Applied Eucerin lotion  Size A7 Tricofix B, 1 roll Rosidal B, 1 roll 8 x 5 Comprilan B, 1 roll 12 x 5 Comprilan B, 1 roll 12 x 10 Comprilan B.  Pt was issued 2nd set of bandages per request due to drainage. Pt instructed to wash the other bandages.    Manual therapy     Neuromuscular Re-education     Therapeutic Activity     Therapeutic Exercises 10 Pt performed 5 reps each of lymph ex's in seated position, pt was given handout with instructions   Gait training     Modality__________________                Total 50    Blank areas are intentional and mean the treatment did not include these items.       Ni Kurtz,CLT  5/18/2018

## 2021-06-18 NOTE — PROGRESS NOTES
[facility] Daily Progress     Patient Name: Ta Mir  Date: 5/29/2018  Visit #: 5  PTA visit #: 3  Referral Diagnosis:   Swelling of left extremity [M79.89]  - Primary       Leg swelling [M79.89]       Venous insufficiency of both lower extremities [I87.2]       Scar condition and fibrosis of skin [L90.5]       Charcot's joint, right ankle and foot [M14.671]       Acquired lymphedema of leg [I89.0]       Diabetic peripheral neuropathy associated with type 2 diabetes mellitus [E11.42]       Morbid obesity with BMI of 50.0-59.9, adult [E66.01, Z68.43]       Pain in both lower extremities [M79.604, M79.605]       Referring provider: Gracie Arteaga, *  Visit Diagnosis:     ICD-10-CM    1. Swelling of left extremity M79.89    2. Leg swelling M79.89    3. Venous insufficiency of both lower extremities I87.2    4. Scar condition and fibrosis of skin L90.5    5. Charcot's joint, right ankle and foot M14.671    6. Acquired lymphedema of leg I89.0    7. Diabetic peripheral neuropathy associated with type 2 diabetes mellitus E11.42    8. Morbid obesity with BMI of 50.0-59.9, adult E66.01     Z68.43    9. Pain in both lower extremities M79.604     M79.605      Assessment:     No wound dressing on left medial calf when pt arrived to PT. Wound weeping.  Pt took bandages off this morning just prior to his appointment (about 2.5 hours).   At this time, patient does have further visits scheduled in PT 3x/wk for the next 2 weeks. We will continue to wrap, exercises and MLD when time allows.      Goal Status: On going  Pt. will demonstrate/verbalize independence in self-management of condition in : 12 weeks  Pt. will be independent with home exercise program in : 12 weeks  Pt will: reduce Left Leg volume by 10% in 6 weeks  Pt will: improve walking tolerance and tolerate stairs in order to sleep in his own bed in 6 weeks    Plan / Patient Education:     Continue with wrapping, modify as needed. Focus on  "exercises in clinic (patient pretty sedentary).  Seems to be about the same since initial visit and swelling is still significant.  Pt to see Dr. Arteaga tomorrow Wednesday 5/30 regarding the wound on his L posterior lower leg.  Subjective:     Pt re wrapped L LE on Sunday. Pt states the bandages on the L leg slide down almost immediately.  Pt took bandages off this morning to shower.   Pt reports he is now getting his exercises 2-3x/day now \"at least 2.\"   Pt states the foam made his skin itch.    Objective:     Caregiver present: No    Observation of swelling: per pt the R LE is more swollen    Volume measurements taken:  yes    Skin condition is:  Dry, wound on L posterior lower leg, some weeping noted  Compression: Took bandages off this morning at 10:00   to shower    Medication for infection:  No    Patient signed ABN for the bandaging supplies: yes.     Provided 4 rolls of comprilan, 2 rolls of Rosidal.   Lymphedema Assessment 5/16/2018 5/30/2018   Right Lower Extremity Total Estimated Volume (cm3) 9872.44 9840.46   Right LE change of volume from last visit (%) - -0.32   Right LE change of volume from initial (%) - -0.32   Left Lower Extremity Total Estimated Volume (cm3) 45518.43 39966.65   Left LE change of volume from last visit (%) - 2.16   Left LE change of volume from initial (%) - 2.16   Swelling Description Left>Right Left>Right   Lower Extremity Swelling Comparison (%) 36.32 39.73       Treatment Today     TREATMENT MINUTES COMMENTS   Evaluation     Self-care/ Home management 30 Re dressed wound L LE with ABD rolled gauze.     Medical compression bandages B LEs:  Applied Eucerin lotion  Size A7 Tricofix R and ortho stockinette on L, 1 roll Rosidal on R LE and 2 on L LE to fill in lower leg to make it more uniform shape. 1 roll 8 x 5 Comprilan B, 1 roll 12 x 5 Comprilan B, 1 roll 12 x 10 Comprilan B.  Tube K on L and comperm G on R   Pt advised to re wrap if bandages are sliding down.    Manual " therapy     Neuromuscular Re-education     Therapeutic Activity     Therapeutic Exercises 15 Reviewed lymph exercises -   Standing ex's:  -hip AB x12 B  -hip extension x 10 B  -marching x10  -mini squats x10  -heel/toe raises x10   Gait training     Modality__________________                Total 45    Blank areas are intentional and mean the treatment did not include these items.       Ni Rogers, PTA,CLT  5/29/2018

## 2021-06-18 NOTE — PROGRESS NOTES
Dear Dr. Arteaga:    Medicare and/or Medicaid requires physician review and approval of the treatment plan. Please review the plan of care and verify that you agree with the therapy plan of care by co-signing this note.    If you have any questions or concerns, please don't hesitate to call.    Sincerely,  Jacklyn Mcfarlane, PT    Physician recommendation:     _X__ Follow therapist's recommendation        ___ Modify therapy    *Physician co-signature indicates they certify the need for these services furnished within this plan and while under their care.          [facility]   Lymphedema Initial Evaluation  Patient Name: Ta Mir  Date of evaluation: 5/16/2018  Today's Date: 5/16/2018  Visit Number: 1 of 20 (per order)  Referring provider: Dr. Arteaga  Referral Diagnosis:   Swelling of left extremity [M79.89]  - Primary       Leg swelling [M79.89]       Venous insufficiency of both lower extremities [I87.2]       Scar condition and fibrosis of skin [L90.5]       Charcot's joint, right ankle and foot [M14.671]       Acquired lymphedema of leg [I89.0]       Diabetic peripheral neuropathy associated with type 2 diabetes mellitus [E11.42]       Morbid obesity with BMI of 50.0-59.9, adult [E66.01, Z68.43]       Pain in both lower extremities [M79.604, M79.605]         Visit Diagnosis:     ICD-10-CM    1. Swelling of left extremity M79.89    2. Leg swelling M79.89    3. Venous insufficiency of both lower extremities I87.2    4. Scar condition and fibrosis of skin L90.5    5. Charcot's joint, right ankle and foot M14.671    6. Acquired lymphedema of leg I89.0    7. Diabetic peripheral neuropathy associated with type 2 diabetes mellitus E11.42    8. Morbid obesity with BMI of 50.0-59.9, adult E66.01     Z68.43    9. Pain in both lower extremities M79.604     M79.605        Assessment:        Ta is a 57 y.o. male who presents to physical therapy today with c/o bilat LE swelling L>R that began to  get bad after cellulitis infection and sepsis in sept 2017. Clinical exam today reveals significant swelling bilat LE L>R and fibrotic tissue in lower legs, dry skin, charcot foot on right. Right foot is more swollen than left foot but left leg is more swollen. Patient would benefit from further PT in order to manage swelling and create home management program.    Lymphedema Category: VI - Stage 2 with Mod-High Functional Demand    Patient's expectations/goals are: Realistic  Barriers to Learning or Achieving Goals: body habitus, charcot foot    Patient's expectations/goals are realistic.    Goals:  Pt. will demonstrate/verbalize independence in self-management of condition in : 12 weeks  Pt. will be independent with home exercise program in : 12 weeks  Pt will: reduce Left Leg volume by 10% in 6 weeks  Pt will: improve walking tolerance and tolerate stairs in order to sleep in his own bed in 6 weeks       Plan:        Plan for next visit: wrap both LEs (today, we only wrapped L LE d/t time from eval). Patient going to remove wraps and shower prior to coming to next appt. Will bring in the wraps he was given today and will als bring the 2 comprillan rolls from the hospital to see if we should use those for right foot wrapping.   Add lymphedema exercises for patient when time.   Add balance exercises for patient for falls risk.     Plan of Care:   Authorization / Certification Start Date: 05/16/18  Authorization / Certification End Date: 08/16/18  Authorization / Certification Number of Visits: 20  Communication with: Referral Source  Patient Related Instruction: Nature of Condition;Treatment plan and rationale;Self Care instruction;Basis of treatment;Body mechanics  Times per Week: 3-5  Number of Weeks: 3-4  Number of Visits: 20  Discharge Planning: to self care/HEP  Precautions / Restrictions : falls risk  Therapeutic Exercise: ROM;Stretching;Strengthening;Lymphedema  Neuromuscular Reeducation:  balance/proprioception;core  Manual Therapy: soft tissue mobilization;myofascial release;joint mobilization;muscle energy  Equipment: compression bandages       Subjective:         History of Present Illness:    Ta is a 57 y.o. male who presents to physical therapy today with c/o bilat LE swelling L>R that began after patient was hospitalized d/t sepsis and cellulitis Sept 2017. He was hospitalized for 15 days, then TCU for 30 days. He was unable to use left lag after this issue, now he can walk on it better.     Has Charcot foot in right leg.   Left leg has always felt weak.     The swelling has gradually worsened over the past few months.   Has had some swelling in abdomen a year ago, increased diuretics back then and it went away.     Patient's swelling has been present since being hospitalized with sepsis  Patient has not had lymphedema therapy in the past. Though, he reports he was wrapped in the TCU.  Patient's swelling changes in the morning, reports it's softer in the morning.     Social information:   Living Situation: lives with parents, usually in the basement, but can't do stairs right now.    Occupation: NA on disability    Work Status: NA   Equipment Available: has compression stockings but they don't fit. He got them from [employer], but unable to get on.     Pertinent PMH:   Patient Active Problem List   Diagnosis     Leg weakness     Balance disorder     Diabetes mellitus     Hypertension     TRUE on CPAP     A-fib     Patella-femoral syndrome     Leg swelling     Venous insufficiency of both lower extremities     Scar condition and fibrosis of skin     Charcot's joint, right ankle and foot     Acquired lymphedema of leg     Diabetic peripheral neuropathy associated with type 2 diabetes mellitus     Morbid obesity with BMI of 50.0-59.9, adult     Pain in both lower extremities   Social information: on disability.    Pain rating/Quality:  Legs are sore, difficulty lifting left leg.    Functional  limitations: walking, stairs, wearing socks, donning socks, his mom has to help him put his socks on right now, driving (hasn't driven since this happened d/t feeling unable to get in the truck).       Objective:      Patient Outcome Measures :    No Data Recorded   Scores range from %, where a score of 20% represents the least impact on the individual's life (minimal physical, psychosocial and functional concerns).     Examination  1. Swelling of left extremity     2. Leg swelling     3. Venous insufficiency of both lower extremities     4. Scar condition and fibrosis of skin     5. Charcot's joint, right ankle and foot     6. Acquired lymphedema of leg     7. Diabetic peripheral neuropathy associated with type 2 diabetes mellitus     8. Morbid obesity with BMI of 50.0-59.9, adult     9. Pain in both lower extremities       Involved side: L>R  Posture Observation:      General sitting posture is  poor.  Surgery: no surgery  Treatments: no CA treatments (no H/O CA)  Precautions/Restrictions: falls risk, previous falls and currently fear of falling d/t knee pain and leg feeling heavy  Involved area: Bilateral Leg  Edema: Pitting at grade and 2+  Induration: Yes  Fibrosis: severe  Temperature: Normal  Sensation: peripheral neuropathy  Skin color: Normal and Brown in places  Skin texture: Dry  Scars: none    Wounds: left posteromedial calf has nickel size open sore, was a blister, now weeping   Muscle tone: normal, but globally weak, deconditioned  Gait: uses 2WW, has since the hospitaliztation d/t left leg feeling heavy and worried about falling. His falls have happened when not using the walker.    LE volume:  Right Lower Extremity Total Estimated Volume (cm3): 9872.44  Left Lower Extremity Total Estimated Volume (cm3): 98048.43  Lower Extremity Swelling Comparison (%): 36.32 %      Patient signed ABD on 5- for Lymph supplies:  Was given  1Rosidal  1Stockinette  3 comprillan    Treatment Today    5/16/2018   TREATMENT MINUTES COMMENTS   Evaluation 30 LE swelling   Self-care/ Home management 30 Ed to patient on lymphedema PT, ed on plan to add exercises, MLD and continue to wrap both LEs. Discussed that patient's mother could learn to wrap and help at home. Discussed that the end goal is to get into velcro garments at the end of this d/t stockings not working.     Wrapped patient as follows:   L LE only today d/t time.   Lotion  Small piece of ABD placed on left calf for open sore and affixed with kerlix.  Tricofix F7 stockinette  rosidal ankle to knee  [id number] short stretch foot to ankle  [id number] short stretch ankle to knee  [id number] short stretch ankle to knee.     Manual therapy     Neuromuscular Re-education     Therapeutic Activity     Therapeutic Exercises       Gait training     Modality__________________                Total 60    Blank areas are intentional and mean the treatment did not include these items.     PT Evaluation Code: (Please list factors)  Patient History/Comorbidities:   Patient Active Problem List   Diagnosis     Leg weakness     Balance disorder     Diabetes mellitus     Hypertension     TRUE on CPAP     A-fib     Patella-femoral syndrome     Leg swelling     Venous insufficiency of both lower extremities     Scar condition and fibrosis of skin     Charcot's joint, right ankle and foot     Acquired lymphedema of leg     Diabetic peripheral neuropathy associated with type 2 diabetes mellitus     Morbid obesity with BMI of 50.0-59.9, adult     Pain in both lower extremities   Examination: L LE swelling  Clinical Presentation: stable  Clinical Decision Making: low    Patient History/  Comorbidities Examination  (body structures and functions, activity limitations, and/or participation restrictions) Clinical Presentation Clinical Decision Making (Complexity)   No documented Comorbidities or personal factors 1-2 Elements Stable and/or uncomplicated Low   1-2 documented comorbidities or  personal factor 3 Elements Evolving clinical presentation with changing characteristics Moderate   3-4 documented comorbidities or personal factors 4 or more Unstable and unpredictable High                Jacklyn Mcfarlane DPT, CLT  5/16/2018  2:33 PM

## 2021-06-18 NOTE — PROGRESS NOTES
[facility] Daily Progress     Patient Name: Ta Mir  Date: 6/6/2018  Visit #: 7  PTA visit #: 4  Referral Diagnosis:   Swelling of left extremity [M79.89]  - Primary       Leg swelling [M79.89]       Venous insufficiency of both lower extremities [I87.2]       Scar condition and fibrosis of skin [L90.5]       Charcot's joint, right ankle and foot [M14.671]       Acquired lymphedema of leg [I89.0]       Diabetic peripheral neuropathy associated with type 2 diabetes mellitus [E11.42]       Morbid obesity with BMI of 50.0-59.9, adult [E66.01, Z68.43]       Pain in both lower extremities [M79.604, M79.605]       Referring provider: Gracie Arteaga, *  Visit Diagnosis:     ICD-10-CM    1. Swelling of left extremity M79.89    2. Leg swelling M79.89    3. Venous insufficiency of both lower extremities I87.2    4. Scar condition and fibrosis of skin L90.5    5. Acquired lymphedema of leg I89.0      Assessment:     Pt took bandages off this morning just prior to his appointment (about 1.5 hours).   Patient should get velcro wraps soon, volumes don't seem to be changing much yet. Addition of wound care may continue to help.   Pt fatiques with short walk, c/o LB and L buttock pain.    Goal Status: On going  Pt. will demonstrate/verbalize independence in self-management of condition in : 12 weeks  Pt. will be independent with home exercise program in : 12 weeks  Pt will: reduce Left Leg volume by 10% in 6 weeks  Pt will: improve walking tolerance and tolerate stairs in order to sleep in his own bed in 6 weeks    Plan / Patient Education:     Continue with wrapping, wound care modify as needed. Focus on exercises in clinic (patient pretty sedentary).  Seems to be about the same since initial visit and swelling is still significant.  Pt going to call to schedule garment fitting for this week if possible.  Pt will follow up at vascular center on 6/12/18    Subjective:     Took wraps off this morning,  "maybe off for 1.5 hours. Pt reports he his doing the lymph exercises 2x/day and getting up every 20-30 minutes. Starting to do the stairs a little more.    Objective:     Caregiver present: No    Observation of swelling: R LE decreased somewhat, L LE softer, still champagne shaped    Volume measurements taken:  no    Skin condition is:  Dry, wound on L posterior lower leg. Per pt wound size=1.5\"    Compression: none, took off 1.5 hours ago to shower     Medication for infection: silver alginate and bandage from vascular clinic    Patient signed ABN for the bandaging supplies: yes.     Provided 4 rolls of comprilan, 2 rolls of Rosidal.   Lymphedema Assessment 5/16/2018 5/30/2018   Right Lower Extremity Total Estimated Volume (cm3) 9872.44 9840.46   Right LE change of volume from last visit (%) - -0.32   Right LE change of volume from initial (%) - -0.32   Left Lower Extremity Total Estimated Volume (cm3) 74052.43 80314.65   Left LE change of volume from last visit (%) - 2.16   Left LE change of volume from initial (%) - 2.16   Swelling Description Left>Right Left>Right   Lower Extremity Swelling Comparison (%) 36.32 39.73       Treatment Today   6/6/2018   TREATMENT MINUTES COMMENTS   Evaluation     Self-care/ Home management 48 Wound care as follows on left medial calf wound:  1) Silvercel Alginate packed in wound (use cotton swab)  2) covered with Allyvn adhesive bandage     Medical compression bandages B LEs:  Applied Cavilon lotion  Size A7 Tricofix R and ortho stockinette on L, 1 roll Rosidal on R LE and 2 on L LE to fill in lower leg to make it more uniform shape. 1 roll 8 x 5 Comprilan B, 1 roll 12 x 5 Comprilan B, 1 roll 12 x 10 Comprilan B.  Tube K on L and comperm G on R   Pt advised to re wrap if bandages are sliding down.   Had to re wrap right LE after trying Nustep d/t patient having discomfort with right wrap. It was better after rewrapping. Left the 3rd comprilan off today on right LE. Reassess if " needing to add it back again next session.    Manual therapy     Neuromuscular Re-education     Therapeutic Activity     Therapeutic Exercises 3 Tried Nustep, patient not fitting well and challenged with getting on and off.   Gait training     Modality__________________                Total 51    Blank areas are intentional and mean the treatment did not include these items.       Jacklyn Mcfarlane, DPT,CLT  6/6/2018

## 2021-06-18 NOTE — PROGRESS NOTES
[facility] Discharge Summary    Patient Name: Ta Mir  Date: 6/13/2018  Visit #: 10  PTA visit #: 5  Referral Diagnosis:   Swelling of left extremity [M79.89]  - Primary       Leg swelling [M79.89]       Venous insufficiency of both lower extremities [I87.2]       Scar condition and fibrosis of skin [L90.5]       Charcot's joint, right ankle and foot [M14.671]       Acquired lymphedema of leg [I89.0]       Diabetic peripheral neuropathy associated with type 2 diabetes mellitus [E11.42]       Morbid obesity with BMI of 50.0-59.9, adult [E66.01, Z68.43]       Pain in both lower extremities [M79.604, M79.605]       Referring provider: Gracie Arteaga, *  Visit Diagnosis:     ICD-10-CM    1. Swelling of left extremity M79.89    2. Leg swelling M79.89    3. Venous insufficiency of both lower extremities I87.2    4. Scar condition and fibrosis of skin L90.5      Assessment:     Patient has lost 11% and 13% of volume. He is now in Farrow Wraps purchased from a . He continues to have left posteromedial calf wound that he has woun supplies for. We are discharging PT to his home maintenance program at this time. He will f/u with vascular to work on wound closure.      Goal Status:   Pt. will demonstrate/verbalize independence in self-management of condition in : 12 weeks: MET  Pt. will be independent with home exercise program in : 12 weeks: MET  Pt will: reduce Left Leg volume by 10% in 6 weeks: MET  Pt will: improve walking tolerance and tolerate stairs in order to sleep in his own bed in 6 weeks: PROGRESSING TOWARD    Plan / Patient Education:     DC PT    Subjective:     Patient has been wearing velcro wraps since last week. doig well with them    Objective:     Caregiver present: No    Observation of swelling: decreased and softer, especially left LE    Volume measurements taken:  Yes, see below    Skin condition is:  Dry, wound on L posterior lower leg. Per pt wound  "size=1.5\"    Compression: Velcro garments Jobst Farrow wrap      Medication for infection: silver alginate and bandage from vascular clinic    Patient signed ABN for the bandaging supplies: yes.     Provided 4 rolls of comprilan, 2 rolls of Rosidal.       LE volumes:  Lymphedema Assessment 5/16/2018 5/30/2018 6/13/2018   Right Lower Extremity Total Estimated Volume (cm3) 9872.44 9840.46 8745.56   Right LE change of volume from last visit (%) - -0.32 -11.13   Right LE change of volume from initial (%) - -0.32 -11.41   Left Lower Extremity Total Estimated Volume (cm3) 90413.43 68184.65 14823.15   Left LE change of volume from last visit (%) - 2.16 -15.49   Left LE change of volume from initial (%) - 2.16 -13.66   Swelling Description Left>Right Left>Right Left>Right   Lower Extremity Swelling Comparison (%) 36.32 39.73 32.87       Treatment Today   6/13/2018   TREATMENT MINUTES COMMENTS   Evaluation     Self-care/ Home management 40 Time included for remeasuring volumes. Discussion of keeping up with home program.  timew for donning farrow wraps   Manual therapy     Neuromuscular Re-education     Therapeutic Activity     Therapeutic Exercises     Gait training     Modality__________________                Total 40    Blank areas are intentional and mean the treatment did not include these items.       Jacklyn Mcfarlane, DPT, CLT  6/13/2018    "

## 2021-06-18 NOTE — PROGRESS NOTES
[facility] Daily Progress     Patient Name: Ta Mir  Date: 6/8/2018  Visit #: 8  PTA visit #: 5  Referral Diagnosis:   Swelling of left extremity [M79.89]  - Primary       Leg swelling [M79.89]       Venous insufficiency of both lower extremities [I87.2]       Scar condition and fibrosis of skin [L90.5]       Charcot's joint, right ankle and foot [M14.671]       Acquired lymphedema of leg [I89.0]       Diabetic peripheral neuropathy associated with type 2 diabetes mellitus [E11.42]       Morbid obesity with BMI of 50.0-59.9, adult [E66.01, Z68.43]       Pain in both lower extremities [M79.604, M79.605]       Referring provider: Gracie Arteaga, *  Visit Diagnosis:   No diagnosis found.  Assessment:     Pt took bandages off this morning just prior to his appointment (about 1.5 hours).  Garment fit today a [facility].   Addition of wound care may continue to help.       Goal Status: On going  Pt. will demonstrate/verbalize independence in self-management of condition in : 12 weeks  Pt. will be independent with home exercise program in : 12 weeks  Pt will: reduce Left Leg volume by 10% in 6 weeks  Pt will: improve walking tolerance and tolerate stairs in order to sleep in his own bed in 6 weeks    Plan / Patient Education:     Continue with wrapping, wound care modify as needed. Focus on exercises in clinic (patient pretty sedentary).  Seems to be about the same since initial visit and swelling is still significant.  Pt to follow up next week to check garment.  Pt will follow up at vascular center on 6/12/18    Subjective:     Pt took bandages took bandages off yesterday,. Pt states that his R foot and leg hurt. Pt states his leg was a bit more swollen.  Pt at [facility] prior to his PT appointment for garment fitting.. Pt's appointment overlapped his at PT. Pt to return to [facility] today to finish up.  Pt with Velcro garment on the L LE.    Objective:     Caregiver  "present: No    Observation of swelling: R LE decreased somewhat, L LE softer, still champagne shaped    Volume measurements taken:  no    Skin condition is:  Dry, wound on L posterior lower leg. Per pt wound size=1.5\"    Compression: Velcro garment on L Jobst Farrow wrap , no compression  on R     Medication for infection: silver alginate and bandage from vascular clinic    Patient signed ABN for the bandaging supplies: yes.     Provided 4 rolls of comprilan, 2 rolls of Rosidal.   Lymphedema Assessment 5/16/2018 5/30/2018   Right Lower Extremity Total Estimated Volume (cm3) 9872.44 9840.46   Right LE change of volume from last visit (%) - -0.32   Right LE change of volume from initial (%) - -0.32   Left Lower Extremity Total Estimated Volume (cm3) 52769.43 93059.65   Left LE change of volume from last visit (%) - 2.16   Left LE change of volume from initial (%) - 2.16   Swelling Description Left>Right Left>Right   Lower Extremity Swelling Comparison (%) 36.32 39.73       Treatment Today   6/8/2018   TREATMENT MINUTES COMMENTS   Evaluation     Self-care/ Home management 25  Discussed with pt checking on foot piece to wear with velcro garment.  Re dressed wound and donned Velcro garment.   Manual therapy     Neuromuscular Re-education     Therapeutic Activity     Therapeutic Exercises     Gait training     Modality__________________                Total 25    Blank areas are intentional and mean the treatment did not include these items.       Ni Rogers,PTA,CLT  6/8/2018    "

## 2021-06-18 NOTE — PROGRESS NOTES
"[facility] Daily Progress     Patient Name: Ta Mir  Date: 6/5/2018  Visit #: 6  PTA visit #: 4  Referral Diagnosis:   Swelling of left extremity [M79.89]  - Primary       Leg swelling [M79.89]       Venous insufficiency of both lower extremities [I87.2]       Scar condition and fibrosis of skin [L90.5]       Charcot's joint, right ankle and foot [M14.671]       Acquired lymphedema of leg [I89.0]       Diabetic peripheral neuropathy associated with type 2 diabetes mellitus [E11.42]       Morbid obesity with BMI of 50.0-59.9, adult [E66.01, Z68.43]       Pain in both lower extremities [M79.604, M79.605]       Referring provider: Gracie Artegaa, *  Visit Diagnosis:   No diagnosis found.  Assessment:       Pt took bandages off this morning just prior to his appointment (about 2.5 hours).   At this time, patient does have further visits scheduled in PT 3x/wk for the next 2 weeks. We will continue to wrap, exercises and MLD when time allows.  Pt fatiques with short walk, c/o LB and L buttock pain.      Goal Status: On going  Pt. will demonstrate/verbalize independence in self-management of condition in : 12 weeks  Pt. will be independent with home exercise program in : 12 weeks  Pt will: reduce Left Leg volume by 10% in 6 weeks  Pt will: improve walking tolerance and tolerate stairs in order to sleep in his own bed in 6 weeks    Plan / Patient Education:     Continue with wrapping, wound care modify as needed. Focus on exercises in clinic (patient pretty sedentary).  Seems to be about the same since initial visit and swelling is still significant.  Pt given order for Velcro garment with instructions to schedule fitting in 2 weeks.  Pt will follow up at vascular center in 2 weeks. (6/12)  Subjective:   Pt reports he took the compression bandages off this morning to shower. Pt states his R leg was \"really skinny\" when he took the bandages off.  Pt reports he his doing the lymph exercises 2x/day " "and getting up every 20-30 minutes.        Objective:     Caregiver present: No    Observation of swelling: R LE decreased somewhat, L LE softer     Volume measurements taken:  no    Skin condition is:  Dry, wound on L posterior lower leg. Per pt wound size=11/2\"  Compression: none       Medication for infection:  No    Patient signed ABN for the bandaging supplies: yes.     Provided 4 rolls of comprilan, 2 rolls of Rosidal.   Lymphedema Assessment 5/16/2018 5/30/2018   Right Lower Extremity Total Estimated Volume (cm3) 9872.44 9840.46   Right LE change of volume from last visit (%) - -0.32   Right LE change of volume from initial (%) - -0.32   Left Lower Extremity Total Estimated Volume (cm3) 95478.43 84797.65   Left LE change of volume from last visit (%) - 2.16   Left LE change of volume from initial (%) - 2.16   Swelling Description Left>Right Left>Right   Lower Extremity Swelling Comparison (%) 36.32 39.73       Treatment Today     TREATMENT MINUTES COMMENTS   Evaluation     Self-care/ Home management 40 Wound care as follows:  1) Silvercel Alginate packed in wound (use cotton swab)  2) covered with Allyvn adhesive bandage       Medical compression bandages B LEs:  Applied Eucerin lotion  Size A7 Tricofix R and ortho stockinette on L, 1 roll Rosidal on R LE and 2 on L LE to fill in lower leg to make it more uniform shape. 1 roll 8 x 5 Comprilan B, 1 roll 12 x 5 Comprilan B, 1 roll 12 x 10 Comprilan B.  Tube K on L and comperm G on R   Pt advised to re wrap if bandages are sliding down.   Instructed self massage for B LEs   Manual therapy     Neuromuscular Re-education     Therapeutic Activity     Therapeutic Exercises 15 Reviewed lymph exercises -   Standing ex's:  -hip AB x12 B  -hip extension x 12 B  -hip flexion x12 B  -marching x12  -mini squats x10  -heel/toe raises x12  Walk with AD x 2'   Gait training     Modality__________________                Total 55    Blank areas are intentional and mean the " treatment did not include these items.       Ni Rogers PTA,CLT  6/5/2018

## 2021-06-19 NOTE — PROGRESS NOTES
"          Nurse Visit    Chief Complaint: Patient presents to clinic for assessment, and treatment of their ulcer and and swelling    Dressing on Arrival off    Allergies:   Allergies   Allergen Reactions     Ampicillin      Metformin Nausea Only     Mold      Ciprofloxacin Palpitations       Medications:   Current Outpatient Prescriptions:      aspirin 81 mg chewable tablet, Chew 81 mg daily., Disp: , Rfl:      cholecalciferol, vitamin D3, (VITAMIN D3) 1,000 unit capsule, Take 3,000 Units by mouth daily., Disp: , Rfl:      diltiazem (CARDIZEM CD) 360 MG 24 hr capsule, Take 360 mg by mouth daily., Disp: , Rfl: 1     flecainide (TAMBOCOR) 50 MG tablet, Take 50 mg by mouth every 12 (twelve) hours., Disp: , Rfl:      furosemide (LASIX) 40 MG tablet, Take 40 mg by mouth 2 (two) times a day., Disp: , Rfl:      generic lancets (ONETOUCH ULTRASOFT), testing three times daily, Disp: , Rfl:      gentamicin (GARAMYCIN) 0.1 % ointment, Apply topically to wound daily, Disp: 30 g, Rfl: 3     glipiZIDE (GLUCOTROL) 10 MG 24 hr tablet, Take 10 mg by mouth 2 (two) times a day before meals. , Disp: , Rfl:      LANTUS SOLOSTAR U-100 INSULIN 100 unit/mL (3 mL) pen, 20 Units 2 (two) times a day. , Disp: , Rfl: 0     liraglutide (VICTOZA) 0.6 mg/0.1 mL (18 mg/3 mL) PnIj injection, Inject 1.8 mg under the skin daily., Disp: , Rfl:      lisinopril (PRINIVIL,ZESTRIL) 20 MG tablet, Take 20 mg by mouth 2 (two) times a day., Disp: , Rfl:      mupirocin (BACTROBAN) 2 % ointment, Apply topically to wound every day, Disp: 30 g, Rfl: 3     propranolol (INDERAL) 20 MG tablet, Take 20 mg by mouth 2 (two) times a day., Disp: , Rfl:      rivaroxaban 20 mg Tab, Take 20 mg by mouth daily with supper., Disp: , Rfl:     Vital Signs: /86  Pulse 92  Temp 98.1  F (36.7  C)  Resp 20  Ht 6' 2.4\" (1.89 m)  Wt (!) 430 lb 14.4 oz (195.5 kg)  BMI 54.73 kg/m2      Assessment:    General:  Patient presents to clinic in no apparent distress.  Psychiatric: "  Alert and oriented x3.   Lower extremity:  edema is present.    Integumentary:  Skin is uniformly warm, dry and pink.    Wound size:   Wound 18 left medial leg (Active)   Pre Size Length 1.5 8/3/2018  1:00 PM   Pre Size Width 0.5 8/3/2018  1:00 PM   Pre Size Depth 0.4 8/3/2018  1:00 PM   Pre Total Sq cm 0.75 8/3/2018  1:00 PM   Description meperix board 2018  2:00 PM   Prod cut Used Foam 2018 12:00 PM      Undermining is not present.    The periwoundskin is WNL      Plan:         1. Patient will follow up 18.         2. Treatment provided will include irrigation and dressings to promote autolytic debridement and will be as listed below     Cleansed with: Hibiclens    Protected skin with: lotion    Dressings Applied: endoform and mepilex border    Compression Applied to the Left Le-Layer    Compression Applied to the Right Leg: None    Offloading applied: walker  Trial Products: no  Provider notified regarding concerns: yes  Treatment Changes: no    Educational Barriers: pain  Taught Regarding: Compression, Dressing, Infection and Pain control  Teaching Method: Exaplanation            Pt arrived without layer on; stated that it fell off two days ago. Dressing changed,scan yellow drainage,and odor. No redness or s/s of infection. Pt denied pain. Pt had high BP, and rechecked again it was still high. Writer told Pt to call his primary . Writer called to Wallace staton MD, [place] and left message.

## 2021-06-19 NOTE — PROGRESS NOTES
Nurse Visit    Chief Complaint: Patient presents to clinic for assement, and treatment of their ulcer and and swelling    Dressing on Arrival Mepilex. Pt had removed 4 layer wrap     Allergies:   Allergies   Allergen Reactions     Ampicillin      Metformin Nausea Only     Mold      Ciprofloxacin Palpitations       Medications:   Current Outpatient Prescriptions:      aspirin 81 mg chewable tablet, Chew 81 mg daily., Disp: , Rfl:      cholecalciferol, vitamin D3, (VITAMIN D3) 1,000 unit capsule, Take 3,000 Units by mouth daily., Disp: , Rfl:      diltiazem (CARDIZEM CD) 360 MG 24 hr capsule, Take 360 mg by mouth daily., Disp: , Rfl: 1     flecainide (TAMBOCOR) 50 MG tablet, Take 50 mg by mouth every 12 (twelve) hours., Disp: , Rfl:      furosemide (LASIX) 40 MG tablet, Take 40 mg by mouth 2 (two) times a day., Disp: , Rfl:      generic lancets (ONETOUCH ULTRASOFT), testing three times daily, Disp: , Rfl:      gentamicin (GARAMYCIN) 0.1 % ointment, Apply topically to wound daily, Disp: 30 g, Rfl: 3     glipiZIDE (GLUCOTROL) 10 MG 24 hr tablet, Take 10 mg by mouth 2 (two) times a day before meals. , Disp: , Rfl:      LANTUS SOLOSTAR U-100 INSULIN 100 unit/mL (3 mL) pen, 20 Units 2 (two) times a day. , Disp: , Rfl: 0     liraglutide (VICTOZA) 0.6 mg/0.1 mL (18 mg/3 mL) PnIj injection, Inject 1.8 mg under the skin daily., Disp: , Rfl:      lisinopril (PRINIVIL,ZESTRIL) 20 MG tablet, Take 20 mg by mouth 2 (two) times a day., Disp: , Rfl:      mupirocin (BACTROBAN) 2 % ointment, Apply topically to wound every day, Disp: 30 g, Rfl: 3     propranolol (INDERAL) 20 MG tablet, Take 20 mg by mouth 2 (two) times a day., Disp: , Rfl:      rivaroxaban 20 mg Tab, Take 20 mg by mouth daily with supper., Disp: , Rfl:     Vital Signs: /68 (Patient Position: Sitting)  Pulse 64  Temp 97.6  F (36.4  C) (Oral)   Resp 20      Assessment:    General:  Patient presents to clinic in no apparent distress.  Psychiatric:  Alert  and oriented x3.   Lower extremity:  edema is present.    Integumentary:  Skin is uniformly warm, dry and pink.    Wound size:   Wound 05/30/18 left medial leg (Active)   Pre Size Length 1.3 7/31/2018 11:00 AM   Pre Size Width 0.5 7/31/2018 11:00 AM   Pre Size Depth 1.2 7/31/2018 11:00 AM   Pre Total Sq cm 0.65 7/31/2018 11:00 AM   Description meperix board 7/27/2018  2:00 PM   Prodcut Used Foam 7/23/2018 12:00 PM      Undermining is not present.    The periwoundskin is WNL      Plan:         1. Patient will in 3 days         2. Treatment provided will include irrigation and dressings to promote autolytic debridement and will be as listed below     Cleansed with: Hibiclens    Protected skin with: Remedy Skin Repair    Dressings Applied: Mepilex and Collagen    Compression Applied: 4-Layer    Offloading applied: None    Trial Products: no  Provider notified regarding concerns: no  Treatment Changes: no    Educational Barriers: none  Taught Regarding: Acitivity, Compliance, Compression and Dressing  Teaching Method: Exaplanation

## 2021-06-19 NOTE — PROGRESS NOTES
Called and left message with Rachel who answered the phone at Dr. Pepper's office that patients BP was high today. Patient stated he did take his medication today. Gave her the BP  #'s. She will pass the information along to Dr. Pepper's nurse and call back if needed.

## 2021-06-19 NOTE — PROGRESS NOTES
Is no packing the wound anymore been using gent/sam on the wound and covering with a foam adhesive, changes it every 2 days. Wears compression wrap on the left everyday but not on the right.

## 2021-06-19 NOTE — PROGRESS NOTES
Nurse Visit    Chief Complaint: Patient presents to clinic for assement, and treatment of their ulcer and and swelling    Dressing on Arrival  - OFF , pt stated the compression wrap fell within 2 hours of leaving the clinic.  He put on his velcro wrap and tubigrip    Allergies:   Allergies   Allergen Reactions     Ampicillin      Metformin Nausea Only     Mold      Ciprofloxacin Palpitations       Medications:   Current Outpatient Prescriptions:      aspirin 81 mg chewable tablet, Chew 81 mg daily., Disp: , Rfl:      cholecalciferol, vitamin D3, (VITAMIN D3) 1,000 unit capsule, Take 3,000 Units by mouth daily., Disp: , Rfl:      diltiazem (CARDIZEM CD) 360 MG 24 hr capsule, Take 360 mg by mouth daily., Disp: , Rfl: 1     flecainide (TAMBOCOR) 50 MG tablet, Take 50 mg by mouth every 12 (twelve) hours., Disp: , Rfl:      furosemide (LASIX) 40 MG tablet, Take 40 mg by mouth 2 (two) times a day., Disp: , Rfl:      generic lancets (ONETOUCH ULTRASOFT), testing three times daily, Disp: , Rfl:      gentamicin (GARAMYCIN) 0.1 % ointment, Apply topically to wound daily, Disp: 30 g, Rfl: 3     glipiZIDE (GLUCOTROL) 10 MG 24 hr tablet, Take 10 mg by mouth 2 (two) times a day before meals. , Disp: , Rfl:      LANTUS SOLOSTAR U-100 INSULIN 100 unit/mL (3 mL) pen, 20 Units 2 (two) times a day. , Disp: , Rfl: 0     liraglutide (VICTOZA) 0.6 mg/0.1 mL (18 mg/3 mL) PnIj injection, Inject 1.8 mg under the skin daily., Disp: , Rfl:      lisinopril (PRINIVIL,ZESTRIL) 20 MG tablet, Take 20 mg by mouth 2 (two) times a day., Disp: , Rfl:      mupirocin (BACTROBAN) 2 % ointment, Apply topically to wound every day, Disp: 30 g, Rfl: 3     propranolol (INDERAL) 20 MG tablet, Take 20 mg by mouth 2 (two) times a day., Disp: , Rfl:      rivaroxaban 20 mg Tab, Take 20 mg by mouth daily with supper., Disp: , Rfl:     Vital Signs: /68 (Patient Site: Left Arm, Patient Position: Sitting, Cuff Size: Adult Large)  Pulse 70  Temp 97.8  " F (36.6  C) (Oral)   Resp 20  Ht 6' 2.4\" (1.89 m)  Wt (!) 437 lb (198.2 kg)  BMI 55.51 kg/m2      Assessment:    General:  Patient presents to clinic in no apparent distress.  Psychiatric:  Alert and oriented x3.   Lower extremity:  edema is present.    Integumentary:  Skin is uniformly warm, dry and pink.    Wound size:   Wound 05/30/18 left medial leg (Active)   Pre Size Length 1.8 7/27/2018  2:00 PM   Pre Size Width 0.7 7/27/2018  2:00 PM   Pre Size Depth 0.4 7/27/2018  2:00 PM   Pre Total Sq cm 1.26 7/27/2018  2:00 PM   Description meperix board 7/27/2018  2:00 PM   Prodcut Used Foam 7/23/2018 12:00 PM      Undermining is not present.    The periwoundskin is WNL      Plan:         1. Patient will in 4 days         2. Treatment provided will include irrigation and dressings to promote autolytic debridement and will be as listed below     Cleansed with: Normal saline. the patient was not aware he was to purchase hibiclens, he will bring it in at his next visit    Protected skin with: 3-M Cavilon    Dressings Applied: endoform and Foam Adhesive    Compression Applied: 4-Layer    Offloading applied: None    Trial Products: no  Provider notified regarding concerns: no  Treatment Changes: no    Educational Barriers: none  Taught Regarding: Acitivity, Compliance, Compression and Infection  Teaching Method: Exaplanation, Handout and Demo      "

## 2021-06-26 NOTE — PROGRESS NOTES
Progress Notes by Ana Mckeon NP at 9/12/2018  2:00 PM     Author: Ana Mckeon NP Service: -- Author Type: Nurse Practitioner    Filed: 9/12/2018  2:45 PM Encounter Date: 9/12/2018 Status: Addendum    : Ana Mckeon NP (Nurse Practitioner)    Related Notes: Original Note by Ana Mckeon NP (Nurse Practitioner) filed at 9/12/2018  2:19 PM       Follow up Vascular Visit       Date of Service:9/12/2018    Date Last Seen: Visit date not found; Visit date not found    Chief Complaint: BLE lymphedema; left leg ulcer    History:   Past Medical History:   Diagnosis Date   ? A-fib (H) 12/2015   ? KAITLIN (acute kidney injury) (H)    ? Allergic rhinitis    ? Cellulitis of left lower extremity    ? Depressive disorder    ? Diabetes mellitus (H)    ? Dysmetabolic syndrome X    ? HLD (hyperlipidemia)    ? Hypertension    ? Hyponatremia    ? Morbid obesity (H)    ? TRUE on CPAP    ? Sepsis (H)    ? Streptococcal bacteremia    ? Tobacco use disorder    ? Varicose vein of leg        Pt returns to the Vascular clinic with regards to their BLE lymphedema and left leg ulcer. Pt arrives alone. Current POC consists of mother irrigating the wound with dilute hibiclens, and applying wick of silver calcium alginate into the wound; coveirng with mepilex border. They did not receive the correct cover dressing; no border on the foam; not happy about this. They are applying velcro wrap previously we used 4 layers; this is only stayed up 1-2 days.  He was initially referred to Bariatrics; but then later canceled this appt; when I previously spoke to him about this he became very defensive and angry and did not want to talk about it further. He is trying to lose weight on his own; reports losing 20 pounds; when I asked how he is doing this he did not know how. He cannot understand how his swelling condition, wound formation; infections relate to his weight; I attempted several times and he did not want to discuss it  "further. He checks his fs daily; does not recall what types of numbers he is getting; his last A1C was down from 6 to 5.9% done last month. Denies fevers, chills, rigors. A culture was obtained 3 months ago this grew Staph no antibiotics were written for. He has completed lymphedema therapy. FELIX was done this was normal; results were previous d/w with him.     Allergies: Ampicillin; Metformin; Mold; and Ciprofloxacin    Physical Exam:    /76 (Patient Position: Sitting)  Pulse 88  Temp 98.1  F (36.7  C) (Oral)   Resp 18  Ht 6' 2\" (1.88 m)  Wt (!) 442 lb 6.4 oz (200.7 kg)  BMI 56.8 kg/m2    General:  Patient presents to clinic in no apparent distress.  Head: normocephalic atraumatic  Psychiatric:  Alert and oriented x3.   Respiratory: unlabored breathing; no cough  Integumentary:  Skin is uniformly warm, dry and pink.    Wound #1 Location: left medial leg  Size: 0.3x 0.5x3cm depth.  No sinus tract present, Wound base: difficult to assess due to small nature of the opening; the depth is improved tracks upwards at 12 o'clock; there is less surrounding induration and  firmness just proximal to the wound near the popliteal space  No undermining present. Periwound: no denudement, erythema, induration, maceration or warmth.      Circumferential volume measures:    Vasc Edema 7/23/2018 8/3/2018 8/7/2018 8/10/2018 8/17/2018   Right just above MTP 32.5 31 29.5 (No Data) 30   Right Ankle 35 31.7 30.5 - 32   Right Widest Calf 49.6 55.7 55.8 - 56.8   Right Thigh Up 10cm 58 75.8 75 - -   Left - just above MTP 30.5 - - - -   Left Ankle 33.4 - - - -   Left Widest Calf 60.6 - - - -   Left Thigh Up 10cm 71 - - - -       Ulceration(s)/Wound(s):     Wound 05/30/18 left medial leg (Active)   Pre Size Length 0.3 9/12/2018  1:00 PM   Pre Size Width 0.5 9/12/2018  1:00 PM   Pre Size Depth 3 9/12/2018  1:00 PM   Pre Total Sq cm 0.15 9/12/2018  1:00 PM   Description diagonal 1.3 cm 8/21/2018 12:00 PM   Prodcut Used Foam 7/23/2018 " 12:00 PM        Lab Values    No results found for: SEDRATE  No results found for: CREATININE  No results found for: HGBA1C  No results found for: BUN  No results found for: LABPROT, LABALUM, ALBUMIN  No results found for: [id number]    6/18/18 left medial posterior calf region      8/21/18 left calf          Impression:  1. Venous insufficiency of both lower extremities --stable US Leg Non Vascular Left   2. Acquired lymphedema of leg --stable US Leg Non Vascular Left   3. Scar condition and fibrosis of skin -stable US Leg Non Vascular Left   4. Ulcer of left calf with necrosis of muscle (H)  US Leg Non Vascular Left   5. Diabetic peripheral neuropathy associated with type 2 diabetes mellitus (H)  US Leg Non Vascular Left   6. Morbid obesity with BMI of 50.0-59.9, adult (H)  US Leg Non Vascular Left   7. Swelling of left extremity  US Leg Non Vascular Left   8. Leg swelling  US Leg Non Vascular Left         Are any of these wounds new today: No; Location: na    Assessment/Plan:          1. Debridement: Excisional debridement of the ulcer(s) was recommended today, after consent was obtained and 2% Xylocaine was applied using a sterile curet the epidermal, dermal, down into the subcutaneous tissue and down into muscle tissue or ligamentous structures was sharply debrided for a total square cm of 0.15. The non-viable and necrotic tissue was removed and the wounds appeared much  afterwards.             2. Edema: completed with lymphedema therapy; the 4 layer is not staying up; pt was removing this after 1-2 days; not working for him; continue velcro wraps; continue these daily; ok for off at bedtime; he sleeps in a bed. The compression velcro wraps were applied today in clinic.           3.  Wound treatment:wound is slightly improvedtoday; previous u/s was negative for abscess, recommend irrigating with dilute hibilcens (30cc in 500cc NS); pat dry; can use a 1cc syringe; then apply wick of silver calcium  alginate all the way to the bottom of the wound bed; cover with bordered foam dressing; he is out of cover dressings with provide with a few  Extra. Dr. Arteaga is considering sending him to Dr. Annmarie Spann for surgical I&D and vac application; will re-evaluate in 3 weeks           4. Nutrition: we previously spoke about his weight and how this can contribute to his swelling and ulcerations as well as infection risk; he becomes very angry when discussing this; would prefer to just speak to his endocrinologist about this. He has declined bariatric referal; did not discuss further at this appt             5. Offloading: not in a load bearing area; na     Patient will follow up with me in 3 weeks for reevaluation as scheduled. They were instructed to call the clinic sooner with any signs or symptoms of infection or any further questions/concerns. Answered all questions.    Ana Mckeon DNP, RN, CNP, [facility]  [phone]        This note was electronically signed by Ana Mckeon

## 2021-06-26 NOTE — PROGRESS NOTES
Progress Notes by Gracie Arteaga MD at 2018  2:00 PM     Author: Gracie Arteaga MD Service: -- Author Type: Physician    Filed: 2018  4:46 PM Encounter Date: 2018 Status: Signed    : Gracie Arteaga MD (Physician)       Date of Service: 2018     Date last seen by Dr. Arteaga:  2018    PCP: Amelia Pepper MD    Impression:  1.  Lower extremity bilaterally much more so on the left leg-much improved  2.  Significant fibrosis and scarring of the skin especially on the left distal leg with champagne leg  3.  New left medial calf ulceration-deep  4.  Type 2 diabetes with peripheral neuropathy  5.  Morbid obesity  6.  Right foot Charcot joint     Plan:  1.  Questions were answered.  2.  FELIX's were fine.  Reviewed.  3.  The weight is causing significant problems.  It is a major contributor to his diabetes, knee pain, problems walking, venous hypertension and insufficiency, leg swelling, increased risk of infection along with multiple other high risk factors. He now is refusing bariatric intervention, even just medical.  Unfortunately, he will continue to have major health problems including infections, ulcers and swelling in the legs with his weight issues.   I tried to explain the importance of dealing with this issue, but he says he just doesn't want to and will not offer any reason for not going. He said he would talk to Dr. Gonsalez about it.     4. Continue therapy then velcro compression.  Velcro written for.  5. Will wear diabetic shoes and insoles when done with therapy.   6.  After discussion of risk factors and consent obtained 2% Lidocaine HCL jelly was applied, under clean conditions, the left, calf, venous stasis/insufficiency with venous hypertension and diabetic ulceration(s) were debrided using currette.  Devitalized and non viable tissue, along with any fibrin and slough, was removed to improve granulation tissue formation, stimulate wound healing,  decrease overall bacteria load, disrupt biofilm formation and decrease edge senescence.  Total excisional debridement was 1.8 sq cm into the muscle/fascia.   Ulcers were  improved afterwards and .  Measures were as noted on the flow sheet .  7.  Because of underlying concern of cellulitis and wound infection, after risk reviewed and permission received,  Singletary swab culture technique was used to send off anaerobic and aerobic Gram stain and culture from the left medial calf ulcer.  8.  Wound treatment will include irrigation and dressings to promote autolytic debridement and will be:  Left medial calf-alginate with silver rope (or cut) to pack to left medial calf ulceration then foam then short stretch today on left.  Short stretch on right. Therapy can integrate wound care.  He needs to bring wound care supplies to therapy with him.   6. Patient will follow up in 3 months, or when needed.       Time spent with patient 15 minutes with greater than 50% time in consultation, education and coordination of care, excluding procedures.     ---------------------------------------------------------------------------------------------------------------------    Chief Complaint: Bilateral leg swelling, left leg much worse     History of Present Illness:     Ta Mir [facility] to [facility] for his bilateral leg swelling and mainly on left leg complicated by left leg cellulitis with hospitalizations for wounds, drainage and swelling.  He had bandaging and lymphedema wraps.    He continued to have problems with the swelling.  I was asked to see him and when I saw him I felt most of this was due to dependent swelling with some venous insufficiency/hypertension complicated by type 2 diabetes with peripheral neuropathy, There was no associated trauma, medication change or major illness.  The swelling has worsened since onset.  Pain is rated a 2 on a 10 point scale.   Pain is described as achey.  He is now having some pain on the back on the left heel.  The swelling is improved with elevation.  It does not resolved.   The patient sleeps in a bed.  History is complicated by known Type II Diabetes with peripheral neuropathy, Venous insufficiency/stasis and Morbid obesity.  He had I&D of a left scrotal abscess in 2010. There has been no new numbness, tingling or weakness.  There have been no new masses, rashes, or swellings of any other joints. There has been no new decrease in appetite, unexplained weight loss, abdominal bloating and bowel or bladder changes.  He has always been very heavy.      Past Medical History:   Diagnosis Date   ? A-fib 12/2015   ? KAITLIN (acute kidney injury)    ? Allergic rhinitis    ? Cellulitis of left lower extremity    ? Depressive disorder    ? Diabetes mellitus    ? Dysmetabolic syndrome X    ? HLD (hyperlipidemia)    ? Hypertension    ? Hyponatremia    ? Morbid obesity    ? TRUE on CPAP    ? Sepsis    ? Streptococcal bacteremia    ? Tobacco use disorder    ? Varicose vein of leg        Past Surgical History:   Procedure Laterality Date   ? ABCESS DRAINAGE  2010    I&D scrotal abcess          Current Outpatient Prescriptions:   ?  acetaminophen (TYLENOL) 325 MG tablet, Take 650 mg by mouth every 4 (four) hours as needed for pain., Disp: , Rfl:   ?  aspirin 81 mg chewable tablet, Chew 81 mg daily., Disp: , Rfl:   ?  cholecalciferol, vitamin D3, (VITAMIN D3) 1,000 unit capsule, Take 3,000 Units by mouth daily., Disp: , Rfl:   ?  diltiazem (CARDIZEM CD) 180 MG 24 hr capsule, Take 240 mg by mouth daily. , Disp: , Rfl:   ?  flecainide (TAMBOCOR) 50 MG tablet, Take 50 mg by mouth every 12 (twelve) hours., Disp: , Rfl:   ?  furosemide (LASIX) 40 MG tablet, Take 40 mg by mouth 2 (two) times a day., Disp: , Rfl:   ?  glipiZIDE (GLUCOTROL) 10 MG 24 hr tablet, Take 10 mg by mouth 2 (two) times a day before meals. , Disp: , Rfl:   ?  insulin glargine (LANTUS) 100  unit/mL injection, Inject 50 Units under the skin 2 (two) times a day. , Disp: , Rfl:   ?  liraglutide (VICTOZA) 0.6 mg/0.1 mL (18 mg/3 mL) PnIj injection, Inject 1.8 mg under the skin daily., Disp: , Rfl:   ?  lisinopril (PRINIVIL,ZESTRIL) 20 MG tablet, Take 20 mg by mouth 2 (two) times a day., Disp: , Rfl:   ?  propranolol (INDERAL) 20 MG tablet, Take 20 mg by mouth 2 (two) times a day., Disp: , Rfl:   ?  rivaroxaban 20 mg Tab, Take 20 mg by mouth daily with supper., Disp: , Rfl:     Allergies   Allergen Reactions   ? Ampicillin    ? Metformin Nausea Only   ? Mold    ? Ciprofloxacin Palpitations       Social History     Social History   ? Marital status: Single     Spouse name: N/A   ? Number of children: N/A   ? Years of education: N/A     Occupational History   ? Not on file.     Social History Main Topics   ? Smoking status: Former Smoker     Packs/day: 3.00     Years: 35.00     Types: Cigarettes     Quit date: 9/19/2014   ? Smokeless tobacco: Never Used   ? Alcohol use No   ? Drug use: No   ? Sexual activity: No     Other Topics Concern   ? Not on file     Social History Narrative    On disability for charcot foot 8/2015.  Single.  No kids.  On disability. Former .        Family History   Problem Relation Age of Onset   ? CABG Father    ? Heart disease Father    ? Coronary artery disease Father    ? Emphysema Father    ? Diabetes Mother        Review of Systems:  Ta Mir no new numbess, tingling or weakness, redness or rashes, fevers, new masses, abdominal bloating or discomfort, unexplained weight loss, increased pain, shortness of breath and chest pain  Full 12 point review of systems was completed.    Imaging:    I personally reviewed the following imaging today and those on care everywhere, if indicated    Us Arterial Pressures Legs Bilateral    Result Date: 5/3/2018  [facility] OUTPATIENT EXAM: RESTING ANKLE-BRACHIAL INDICES (ABIs) INDICATION: Bilateral leg pain and  swelling. COMPARISON: None. FELIX FINDINGS: SEGMENTAL BP RIGHT Brachial: 170 Ankle (PT): 200; Index: 1.18 Ankle (DP): 197; Index: 1.16 Digit: 146; Index: 0.86 LEFT Brachial: 164 Ankle (PT): 173; Index: 1.02 Ankle (DP): 189; Index: 1.11 Digit: 111; Index: 0.65     1. RIGHT LOWER EXTREMITY: FELIX at rest is normal. 2. LEFT LOWER EXTREMITY: FELIX at rest is normal.      Labs:    I personally reviewed the following labs today and those on care everywhere, if indicated    9/23/17  CRP 14.3  3/1/18 CBC wnl  3/22/18 Cr 1.03   Glu 98    Physical Exam:  Vitals:    05/30/18 1359   BP: 166/76   Pulse: (!) 109   Temp: 98.1  F (36.7  C)   SpO2: 94%      BMI 54.31    Circumferential measures:    Vasc Edema 4/30/2018 5/30/2018   Right just above MTP 32.0 31.7   Right Ankle 34.5 30   Right Widest Calf 51.0 48.5   Right Thigh Up 10cm 75.0 61.5   Left - just above MTP 30.4 29.5   Left Ankle 33.0 30   Left Widest Calf 71.5 62   Left Thigh Up 10cm 80.5 73.5     Measures greatly improved.    Wound 05/30/18 left medial leg (Active)   Pre Size Length 0.5 5/30/2018  2:00 PM   Pre Size Width 1.9 5/30/2018  2:00 PM   Pre Size Depth 1.1 5/30/2018  2:00 PM   Pre Total Sq cm 0.95 5/30/2018  2:00 PM        General:  57 y.o. male in no apparent distress.      Psych: Alert and oriented x 3.  Cooperative. Affect normal.    Musculoskeletal:   Right foot Charcot with poor range of motion of the right ankle.        Vascular: There are moderate telangietasias, medial ankle venous flares and spider veins.     Integumentary: Skin of the legs is uniformly warm and dry, and hyperpigmentated, with hyperkeratosis and keratoderma and with positive Stemmer's Sign .  1 X 1.8 cm and 3.5 cm deep ulceration on left medial mid calf with significant slough, elevated edges.  Aleksandra wound erythema with no calor or maceration. Serosanguinous discharge.  No odor.  Nails are thickened, thin surrounding skin, decreased hair growth on toes and discolored.      L med calf  5/30/18    Gracie Arteaga MD, ABWMS, FACCWS, [facility]  Medical Director Wound Care and Lymphedema  [facility]  [phone]

## 2021-06-26 NOTE — PROGRESS NOTES
Progress Notes by Dana Jama LPN at 8/7/2018  1:00 PM     Author: Dana Jama LPN Service: -- Author Type: Licensed Nurse    Filed: 8/7/2018  1:44 PM Encounter Date: 8/7/2018 Status: Signed    : Dana Jama LPN (Licensed Nurse)       Nurse Visit        Patient arrived today with 4 layer wrap off. Patient stated it fell down last Friday night 8/3/18- the same day it was put on. Patient has had problems with it falling down frequently. Re-wrapped the 4 layer and applied size J/K Dermafit from just below the knee to mid thigh. Patient is okay trying this to see if helps the compression wrap stay up. Advised patient if he doesn't like the Dermafit on his upper leg that he can take it off. He is understanding of the plan and will call with any concerns.    Chief Complaint: Patient presents to clinic for assement, and treatment of their ulcer and and swelling    Dressing on Arrival: Velcro compression, Mepilex adhesive, endoform    Allergies:   Allergies   Allergen Reactions   ? Ampicillin    ? Metformin Nausea Only   ? Mold    ? Ciprofloxacin Palpitations       Medications:   Current Outpatient Prescriptions:   ?  aspirin 81 mg chewable tablet, Chew 81 mg daily., Disp: , Rfl:   ?  cholecalciferol, vitamin D3, (VITAMIN D3) 1,000 unit capsule, Take 3,000 Units by mouth daily., Disp: , Rfl:   ?  diltiazem (CARDIZEM CD) 360 MG 24 hr capsule, Take 360 mg by mouth daily., Disp: , Rfl: 1  ?  flecainide (TAMBOCOR) 50 MG tablet, Take 50 mg by mouth every 12 (twelve) hours., Disp: , Rfl:   ?  furosemide (LASIX) 40 MG tablet, Take 40 mg by mouth 2 (two) times a day., Disp: , Rfl:   ?  generic lancets (ONETOUCH ULTRASOFT), testing three times daily, Disp: , Rfl:   ?  glipiZIDE (GLUCOTROL) 10 MG 24 hr tablet, Take 10 mg by mouth 2 (two) times a day before meals. , Disp: , Rfl:   ?  LANTUS SOLOSTAR U-100 INSULIN 100 unit/mL (3 mL) pen, 20 Units 2 (two) times a day. , Disp: , Rfl: 0  ?  liraglutide  "(VICTOZA) 0.6 mg/0.1 mL (18 mg/3 mL) PnIj injection, Inject 1.8 mg under the skin daily., Disp: , Rfl:   ?  lisinopril (PRINIVIL,ZESTRIL) 20 MG tablet, Take 20 mg by mouth 2 (two) times a day., Disp: , Rfl:   ?  propranolol (INDERAL) 20 MG tablet, Take 20 mg by mouth 2 (two) times a day., Disp: , Rfl:   ?  rivaroxaban 20 mg Tab, Take 20 mg by mouth daily with supper., Disp: , Rfl:     Vital Signs: /78  Pulse 76  Temp 97.5  F (36.4  C) (Oral)   Resp 18  Ht 6' 2\" (1.88 m)  Wt (!) 434 lb 9.6 oz (197.1 kg)  BMI 55.8 kg/m2      Assessment:    General:  Patient presents to clinic in no apparent distress.  Psychiatric:  Alert and oriented x3.   Lower extremity:  edema is present.    Integumentary:  Skin is uniformly warm, dry and pink.    Wound size:   Wound 18 left medial leg (Active)   Pre Size Length 0.6 2018  1:00 PM   Pre Size Width 0.6 2018  1:00 PM   Pre Size Depth 1.2 2018  1:00 PM   Pre Total Sq cm 0.36 2018  1:00 PM   Description 1.5cm @ 10 & 4 2018  1:00 PM   Prodcut Used Foam 2018 12:00 PM     Vasc Edema 2018 2018 2018 8/3/2018 2018   Right just above MTP 32.0 31.7 32.5 31 29.5   Right Ankle 34.5 30 35 31.7 30.5   Right Widest Calf 51.0 48.5 49.6 55.7 55.8   Right Thigh Up 10cm 75.0 61.5 58 75.8 75   Left - just above MTP 30.4 29.5 30.5 - -   Left Ankle 33.0 30 33.4 - -   Left Widest Calf 71.5 62 60.6 - -   Left Thigh Up 10cm 80.5 73.5 71 - -       Undermining is not present.    The periwoundskin is pink      Plan:         1. Patient will follow up on Friday 8/10/18 for nurse visit         2. Treatment provided will include irrigation and dressings to promote autolytic debridement and will be as listed below     Cleansed with: Hibiclens    Protected skin with: Remedy Skin Repair    Dressings Applied: Collagen  (Endoform lightly packed)    Compression Applied to the Left Le-Layer    Compression Applied to the Right Leg: Patient has velcro " comrpession    Offloading applied: Walker    Trial Products: no  Provider notified regarding concerns: no  Treatment Changes: no    Educational Barriers: none  Taught Regarding: Acitivity, Compliance, Compression and Dressing  Teaching Method: Exaplanation and DC sheet

## 2021-06-26 NOTE — PROGRESS NOTES
Progress Notes by Ana Mckeon NP at 8/21/2018  1:00 PM     Author: Ana Mckeon NP Service: -- Author Type: Nurse Practitioner    Filed: 8/21/2018  2:41 PM Encounter Date: 8/21/2018 Status: Signed    : Ana Mckeon NP (Nurse Practitioner)       Follow up Vascular Visit       Date of Service:8/21/2018    Date Last Seen: Visit date not found; Visit date not found    Chief Complaint: BLE lymphedema; left leg ulcer    History:   Past Medical History:   Diagnosis Date   ? A-fib (H) 12/2015   ? KAITLIN (acute kidney injury) (H)    ? Allergic rhinitis    ? Cellulitis of left lower extremity    ? Depressive disorder    ? Diabetes mellitus (H)    ? Dysmetabolic syndrome X    ? HLD (hyperlipidemia)    ? Hypertension    ? Hyponatremia    ? Morbid obesity (H)    ? TRUE on CPAP    ? Sepsis (H)    ? Streptococcal bacteremia    ? Tobacco use disorder    ? Varicose vein of leg        Pt returns to the Vascular clinic with regards to their BLE lymphedema and left leg ulcer. Pt arrives alone. Current POC consists of coming to the clinic twice per week irrigating the wound with dilute hibiclens, and applying endoform collagen into the wound; coveirng with mepilex border. They are wrapping him with 4 layers; this is only staying up 1-2 days and he is removing and going to velcro wraps.  He was initially referred to Bariatrics; but then later canceled this appt; when I previously spoke to him about this he became very defensive and angry and did not want to talk about it further. He is trying to lose weight on his own; reports losing 20 pounds; when I asked how he is doing this he did not know how. He cannot understand how his swelling condition, wound formation; infections relate to his weight; I attempted several times and he did not want to discuss it further. He checks his fs daily; does not recall what types of numbers he is getting; his last A1C was down from 6 to 5.9% done last month. Denies fevers, chills,  "rigors. Is out of wound care supplies. A culture was obtained 2 months ago this grew Staph no antibiotics were written for. He has completed lymphedema therapy. FELIX was done this was normal; results were previous d/w with him.     Allergies: Ampicillin; Metformin; Mold; and Ciprofloxacin    Physical Exam:    /74 (Patient Position: Sitting)  Pulse 72  Temp 98.1  F (36.7  C) (Oral)   Resp 18  Ht 6' 2\" (1.88 m)  Wt (!) 434 lb 8 oz (197.1 kg)  BMI 55.79 kg/m2    General:  Patient presents to clinic in no apparent distress.  Head: normocephalic atraumatic  Psychiatric:  Alert and oriented x3.   Respiratory: unlabored breathing; no cough  Integumentary:  Skin is uniformly warm, dry and pink.    Wound #1 Location: left medial leg  Size: 0.3x 0.5x3.2cmdepth.  No sinus tract present, Wound base: difficult to assess due to small nature of the opening; the depth is worse; tracks upwards at 12 o'clock; there is surrounding induration and additional area of firmness just proximal to the wound near the popliteal space  No undermining present. Periwound: no denudement, erythema, induration, maceration or warmth.      Circumferential volume measures:    Vasc Edema 7/23/2018 8/3/2018 8/7/2018 8/10/2018 8/17/2018   Right just above MTP 32.5 31 29.5 (No Data) 30   Right Ankle 35 31.7 30.5 - 32   Right Widest Calf 49.6 55.7 55.8 - 56.8   Right Thigh Up 10cm 58 75.8 75 - -   Left - just above MTP 30.5 - - - -   Left Ankle 33.4 - - - -   Left Widest Calf 60.6 - - - -   Left Thigh Up 10cm 71 - - - -       Ulceration(s)/Wound(s):     Wound 05/30/18 left medial leg (Active)   Pre Size Length 0.3 8/21/2018 12:00 PM   Pre Size Width 0.5 8/21/2018 12:00 PM   Pre Size Depth 3.2 8/21/2018 12:00 PM   Pre Total Sq cm 0.15 8/21/2018 12:00 PM   Description diagonal 1.3 cm 8/21/2018 12:00 PM   Prodcut Used Foam 7/23/2018 12:00 PM        Lab Values    No results found for: SEDRATE  No results found for: CREATININE  No results found for: " HGBA1C  No results found for: BUN  No results found for: LABPROT, LABALUM, ALBUMIN  No results found for: [id number]    6/18/18 left medial posterior calf region      8/21/18 left calf          Impression:  1. Venous insufficiency of both lower extremities --stable US Leg Non Vascular Left   2. Acquired lymphedema of leg --stable US Leg Non Vascular Left   3. Scar condition and fibrosis of skin -stable US Leg Non Vascular Left   4. Ulcer of left calf with necrosis of muscle (H)  US Leg Non Vascular Left   5. Diabetic peripheral neuropathy associated with type 2 diabetes mellitus (H)  US Leg Non Vascular Left   6. Morbid obesity with BMI of 50.0-59.9, adult (H)  US Leg Non Vascular Left   7. Swelling of left extremity  US Leg Non Vascular Left   8. Leg swelling  US Leg Non Vascular Left         Are any of these wounds new today: No; Location: na    Assessment/Plan:          1. Debridement: Excisional debridement of the ulcer(s) was recommended today, after consent was obtained and 2% Xylocaine was applied using a sterile curet the epidermal, dermal, down into the subcutaneous tissue and down into muscle tissue or ligamentous structures was sharply debrided for a total square cm of 0.15. The non-viable and necrotic tissue was removed and the wounds appeared much  afterwards.             2. Edema: completed with lymphedema therapy; the 4 layer is not staying up; pt is removing this after 1-2 days; not working for him; has velcro wraps; continue these daily; ok for off at bedtime; he sleeps in a bed. The compression velcro wraps were applied today in clinic.           3.  Wound treatment:wound is worse today; previous u/s was negative for abscess, recommend irrigating with dilute hibilcens (30cc in 500cc NS); pat dry; can use a 1cc syringe; then apply wick of silver calcium alginate all the way to the bottom of the wound bed; cover with bordered foam dressing; he is out of cover dressings with provide with a  few  Extra. Dr. Arteaga is considering sending him to Dr. Annmarie Spann for surgical I&D and vac application; will re-evaluate in 3 weeks           4. Nutrition: we previously spoke about his weight and how this can contribute to his swelling and ulcerations as well as infection risk; he becomes very angry when discussing this; would prefer to just speak to his endocrinologist about this. He has declined bariatric referal; did not discuss further at this appt             5. Offloading: not in a load bearing area; na     Patient will follow up with Dr. Arteaga in 8 weeks for reevaluation as scheduled; I will see in 3 weeks. They were instructed to call the clinic sooner with any signs or symptoms of infection or any further questions/concerns. Answered all questions.    Ana Mckeon DNP, RN, CNP, [facility]  [phone]        This note was electronically signed by Ana Mckeon

## 2021-06-26 NOTE — PROGRESS NOTES
Progress Notes by Marielena Harrington RN at 8/14/2018  1:00 PM     Author: Marielena Harrington RN Service: -- Author Type: Registered Nurse    Filed: 8/14/2018  1:15 PM Encounter Date: 8/14/2018 Status: Signed    : Marielena Harrington RN (Registered Nurse)                 Nurse Visit      L leg      L leg wound      Chief Complaint: Patient presents to clinic for assement, and treatment of their ulcer and and swelling    Dressing on Arrival velcro compression, mepilex adhesive and endoform     Allergies:   Allergies   Allergen Reactions   ? Ampicillin    ? Metformin Nausea Only   ? Mold    ? Ciprofloxacin Palpitations       Medications:   Current Outpatient Prescriptions:   ?  aspirin 81 mg chewable tablet, Chew 81 mg daily., Disp: , Rfl:   ?  cholecalciferol, vitamin D3, (VITAMIN D3) 1,000 unit capsule, Take 3,000 Units by mouth daily., Disp: , Rfl:   ?  diltiazem (CARDIZEM CD) 360 MG 24 hr capsule, Take 360 mg by mouth daily., Disp: , Rfl: 1  ?  flecainide (TAMBOCOR) 50 MG tablet, Take 50 mg by mouth every 12 (twelve) hours., Disp: , Rfl:   ?  furosemide (LASIX) 40 MG tablet, Take 40 mg by mouth 2 (two) times a day., Disp: , Rfl:   ?  generic lancets (ONETOUCH ULTRASOFT), testing three times daily, Disp: , Rfl:   ?  glipiZIDE (GLUCOTROL) 10 MG 24 hr tablet, Take 10 mg by mouth 2 (two) times a day before meals. , Disp: , Rfl:   ?  LANTUS SOLOSTAR U-100 INSULIN 100 unit/mL (3 mL) pen, 20 Units 2 (two) times a day. , Disp: , Rfl: 0  ?  liraglutide (VICTOZA) 0.6 mg/0.1 mL (18 mg/3 mL) PnIj injection, Inject 1.8 mg under the skin daily., Disp: , Rfl:   ?  lisinopril (PRINIVIL,ZESTRIL) 20 MG tablet, Take 20 mg by mouth 2 (two) times a day., Disp: , Rfl:   ?  propranolol (INDERAL) 20 MG tablet, Take 20 mg by mouth 2 (two) times a day., Disp: , Rfl:   ?  rivaroxaban 20 mg Tab, Take 20 mg by mouth daily with supper., Disp: , Rfl:     Vital Signs: /80  Pulse 92  Temp 98  F (36.7  C)  Resp 14      Assessment:   "  General:  Patient presents to clinic in no apparent distress.  Psychiatric:  Alert and oriented x3.   Lower extremity:  edema is present.    Integumentary:  Skin is uniformly warm, dry and pink.    Wound size:   Wound 18 left medial leg (Active)   Pre Size Length 1.5 2018 12:00 PM   Pre Size Width 0.4 2018 12:00 PM   Pre Size Depth 0.4 2018 12:00 PM   Pre Total Sq cm 0.6 2018 12:00 PM   Description 1.5cm @ 10 & 4 2018  1:00 PM   Prodcut Used Foam 2018 12:00 PM      Undermining is not present.    The periwoundskin is pink      Plan:         1. Patient will in 3 days         2. Treatment provided will include irrigation and dressings to promote autolytic debridement and will be as listed below.        Patient arrived today with 4 layer wrap off. Patient stated it fell down last Friday night 8/10/18- the same day it was put on. Patient has had problems with it falling down frequently. He put his velcro wrap on instead and dressings stayed in place. Re-wrapped the 4 layer and offered to apply dermafit and he refused.  Informed to call if 4 layer wrap falls down before Friday/      Cleansed with: Hibiclens    Protected skin with: Remedy Skin Repair    Dressings Applied: Endoform pack lightly and mepilex adhesive     Compression Applied to the Left Le-Layer    Compression Applied to the Right Leg: Velcro\", Compression Stockings    Offloading applied: walker    Trial Products: no  Provider notified regarding concerns: no  Treatment Changes: no    Educational Barriers: none  Taught Regarding: Dressing  Teaching Method: Exaplanation           "

## 2021-06-26 NOTE — PROGRESS NOTES
Progress Notes by Gracie Arteaga MD at 2018 12:40 PM     Author: Gracie Arteaga MD Service: -- Author Type: Physician    Filed: 2018  5:09 PM Encounter Date: 2018 Status: Signed    : Gracie Arteaga MD (Physician)       Date of Service:  18    Date last seen by Dr. Arteaga:  2018    PCP: Amelia Pepper MD    Impression:  1.  Lower extremity bilaterally much more so on the left leg-much improved  2.  Significant fibrosis and scarring of the skin especially on the left distal leg with champagne leg  3.  Left medial calf ulceration-continues to be very deep  4.  Type 2 diabetes with peripheral neuropathy  5.  Morbid obesity  6.  Right foot Charcot joint     Plan:  1.   Questions were answered.  2.   Continue velcro compression.   3.   Wear diabetic shoes and insoles.     4.   After discussion of risk factors and consent obtained 2% Lidocaine HCL jelly was applied, under clean conditions, the left, calf, venous stasis/insufficiency with venous hypertension and diabetic ulceration(s) were debrided using currette.  Devitalized and non viable tissue, along with any fibrin and slough, was removed to improve granulation tissue formation, stimulate wound healing, decrease overall bacteria load, disrupt biofilm formation and decrease edge senescence.  Total excisional debridement was 0.64 sq cm into the muscle/fascia.   Ulcers were  improved afterwards and .  Measures were as noted on the flow sheet .  5.  Wound treatment will include irrigation and dressings to promote autolytic debridement and will be:  Left medial calf-hibiclens then endoderm pack lite then foam with silicon border then 4 layer on left. I do not think the sam/gent is doing enough to allow the deept tissue to heal.    6.  Patient will follow up in 1 month with CNP for continuing care.  .  May required deeper surgical debridement with post surgical VAC to have chance to heal.  If not improving,  would ask Dr. Spann to see at [facility].  He continues to refuse to see bariatrics, even for non-surgical intervention. I will see in 3 months.     Time spent with patient 15 minutes with greater than 50% time in consultation, education and coordination of care, excluding procedures.     ---------------------------------------------------------------------------------------------------------------------    Chief Complaint: Bilateral leg swelling, left leg much worse     History of Present Illness:     Ta Mir returns to the [facility] for his bilateral leg swelling and mainly on left leg complicated by left leg cellulitis with hospitalizations for wounds, drainage and swelling.  He had bandaging and lymphedema wraps.    He continued to have problems with the swelling and when  I saw him I felt most of this was due to dependent swelling with some venous insufficiency/hypertension complicated by type 2 diabetes with peripheral neuropathy and morbid obesity.  Debridement was done and wound care of left medial calf-alginate with silver rope (or cut) to pack to left medial calf ulceration then foam then short stretch today on left with short stretch on right. Therapy was to integrate the wound care.  He was seen in follow up by my CNP Ana Ferrara.  The packing was not being done correctly and she felt the wound was worsening.  Wound care was changed to Hibiclens, gent/sam ointment then border dressing and his velcro compression.  He is here for follow up.  There has been no new numbness, tingling or weakness.  There have been no new masses, rashes, or swellings of any other joints. There has been no new decrease in appetite, unexplained weight loss, abdominal bloating and bowel or bladder changes.  He has always been very heavy.  He reports the swelling decreased with the therapy.  The wound has been stable.      Past Medical History:   Diagnosis Date   ? A-fib  (H) 12/2015   ? KAITLIN (acute kidney injury) (H)    ? Allergic rhinitis    ? Cellulitis of left lower extremity    ? Depressive disorder    ? Diabetes mellitus (H)    ? Dysmetabolic syndrome X    ? HLD (hyperlipidemia)    ? Hypertension    ? Hyponatremia    ? Morbid obesity (H)    ? TRUE on CPAP    ? Sepsis (H)    ? Streptococcal bacteremia    ? Tobacco use disorder    ? Varicose vein of leg        Past Surgical History:   Procedure Laterality Date   ? ABCESS DRAINAGE  2010    I&D scrotal abcess          Current Outpatient Prescriptions:   ?  aspirin 81 mg chewable tablet, Chew 81 mg daily., Disp: , Rfl:   ?  cholecalciferol, vitamin D3, (VITAMIN D3) 1,000 unit capsule, Take 3,000 Units by mouth daily., Disp: , Rfl:   ?  diltiazem (CARDIZEM CD) 360 MG 24 hr capsule, Take 360 mg by mouth daily., Disp: , Rfl: 1  ?  flecainide (TAMBOCOR) 50 MG tablet, Take 50 mg by mouth every 12 (twelve) hours., Disp: , Rfl:   ?  furosemide (LASIX) 40 MG tablet, Take 40 mg by mouth 2 (two) times a day., Disp: , Rfl:   ?  generic lancets (ONETOUCH ULTRASOFT), testing three times daily, Disp: , Rfl:   ?  gentamicin (GARAMYCIN) 0.1 % ointment, Apply topically to wound daily, Disp: 30 g, Rfl: 3  ?  glipiZIDE (GLUCOTROL) 10 MG 24 hr tablet, Take 10 mg by mouth 2 (two) times a day before meals. , Disp: , Rfl:   ?  LANTUS SOLOSTAR U-100 INSULIN 100 unit/mL (3 mL) pen, 20 Units 2 (two) times a day. , Disp: , Rfl: 0  ?  liraglutide (VICTOZA) 0.6 mg/0.1 mL (18 mg/3 mL) PnIj injection, Inject 1.8 mg under the skin daily., Disp: , Rfl:   ?  lisinopril (PRINIVIL,ZESTRIL) 20 MG tablet, Take 20 mg by mouth 2 (two) times a day., Disp: , Rfl:   ?  mupirocin (BACTROBAN) 2 % ointment, Apply topically to wound every day, Disp: 30 g, Rfl: 3  ?  propranolol (INDERAL) 20 MG tablet, Take 20 mg by mouth 2 (two) times a day., Disp: , Rfl:   ?  rivaroxaban 20 mg Tab, Take 20 mg by mouth daily with supper., Disp: , Rfl:     Allergies   Allergen Reactions   ?  Ampicillin    ? Metformin Nausea Only   ? Mold    ? Ciprofloxacin Palpitations       Social History     Social History   ? Marital status: Single     Spouse name: N/A   ? Number of children: N/A   ? Years of education: N/A     Occupational History   ? Not on file.     Social History Main Topics   ? Smoking status: Former Smoker     Packs/day: 3.00     Years: 35.00     Types: Cigarettes     Quit date: 9/19/2014   ? Smokeless tobacco: Never Used   ? Alcohol use No   ? Drug use: No   ? Sexual activity: No     Other Topics Concern   ? Not on file     Social History Narrative    On disability for charcot foot 8/2015.  Single.  No kids.  On disability. Former .        Family History   Problem Relation Age of Onset   ? CABG Father    ? Heart disease Father    ? Coronary artery disease Father    ? Emphysema Father    ? Diabetes Mother        Review of Systems:  Ta Mir no new numbess, tingling or weakness, redness or rashes, fevers, new masses, abdominal bloating or discomfort, unexplained weight loss, increased pain, shortness of breath and chest pain  Full 12 point review of systems was completed.    Imaging:    I personally reviewed the following imaging today and those on care everywhere, if indicated    Us Leg Non Vascular Left    Result Date: 6/18/2018  [facility] OUTPATIENT EXAM: US LEG NON VASCULAR LEFT DATE: 6/18/2018 12:29 PM INDICATION: Left leg ulcer; induration just proximal to the ulcer; rule out abscess COMPARISON: No pertinent comparison study is available for review. TECHNIQUE: Targeted grayscale and color imaging of the left posterior calf FINDINGS: Targeted sonography of the left posterior calf in the area of palpable concern demonstrates edematous soft tissues without evidence of loculated fluid collection. No hypervascularity is noted.     1.  No evidence of loculated fluid collection within the superficial soft tissues in the palpable area of concern of the left calf.        Labs:    I personally reviewed the following labs today and those on care everywhere, if indicated    9/23/17  CRP 14.3  3/1/18 CBC wnl  3/22/18 Cr 1.03   Glu 98    Physical Exam:  Vitals:    07/23/18 1240   BP: 124/62   Pulse: 76   Resp: 18   Temp: 98.2  F (36.8  C)      BMI 55.85 (increased)    Circumferential measures:    Vasc Edema 4/30/2018 5/30/2018 7/23/2018   Right just above MTP 32.0 31.7 32.5   Right Ankle 34.5 30 35   Right Widest Calf 51.0 48.5 49.6   Right Thigh Up 10cm 75.0 61.5 58   Left - just above MTP 30.4 29.5 30.5   Left Ankle 33.0 30 33.4   Left Widest Calf 71.5 62 60.6   Left Thigh Up 10cm 80.5 73.5 71     Measures greatly improved.    Wound 05/30/18 left medial leg (Active)   Pre Size Length 0.6 7/23/2018 12:00 PM   Pre Size Width 0.8 7/23/2018 12:00 PM   Pre Size Depth 1.6 7/23/2018 12:00 PM   Pre Total Sq cm 0.48 7/23/2018 12:00 PM   Description foam adhesive 7/23/2018 12:00 PM   Prodcut Used Foam 7/23/2018 12:00 PM        General:  57 y.o. male in no apparent distress.      Psych: Alert and oriented x 3.  Cooperative. Affect normal.    Musculoskeletal:   Right foot Charcot with poor range of motion of the right ankle.        Vascular: There are moderate telangietasias, medial ankle venous flares and spider veins.     Integumentary: Skin of the legs is uniformly warm and dry, and hyperpigmentated, with hyperkeratosis and keratoderma and with positive Stemmer's Sign .  0.4 X 1.6 cm and 3.0 cm deep ulceration on left medial mid calf with significant slough, elevated edges.  Aleksandra wound without rubor, calor or maceration. Serosanguinous discharge.  No odor.  Nails are thickened, thin surrounding skin, decreased hair growth on toes and discolored.            L med calf 5/30/18    Gracie Arteaga MD, ABWMS, FACCWS, [facility]  Medical Director Wound Care and Lymphedema  [facility]  [phone]

## 2021-06-26 NOTE — PROGRESS NOTES
Progress Notes by Dana Jama LPN at 8/17/2018  1:00 PM     Author: Daan Jama LPN Service: -- Author Type: Licensed Nurse    Filed: 8/17/2018  1:50 PM Encounter Date: 8/17/2018 Status: Signed    : Dana Jama LPN (Licensed Nurse)           Nurse Visit    Patient arrived today with 4 layer wrap off. Patient stated it fell down on 8/15/18. Patient has had problems with it falling down frequently. Re-applied the dressing and re-wrapped the 4 layer.     Chief Complaint: Patient presents to clinic for assement, and treatment of their ulcer and and swelling    Dressing on Arrival: velcro compression, mepilex, endoform    Allergies:   Allergies   Allergen Reactions   ? Ampicillin    ? Metformin Nausea Only   ? Mold    ? Ciprofloxacin Palpitations       Medications:   Current Outpatient Prescriptions:   ?  aspirin 81 mg chewable tablet, Chew 81 mg daily., Disp: , Rfl:   ?  cholecalciferol, vitamin D3, (VITAMIN D3) 1,000 unit capsule, Take 3,000 Units by mouth daily., Disp: , Rfl:   ?  diltiazem (CARDIZEM CD) 360 MG 24 hr capsule, Take 360 mg by mouth daily., Disp: , Rfl: 1  ?  flecainide (TAMBOCOR) 50 MG tablet, Take 50 mg by mouth every 12 (twelve) hours., Disp: , Rfl:   ?  furosemide (LASIX) 40 MG tablet, Take 40 mg by mouth 2 (two) times a day., Disp: , Rfl:   ?  generic lancets (ONETOUCH ULTRASOFT), testing three times daily, Disp: , Rfl:   ?  glipiZIDE (GLUCOTROL) 10 MG 24 hr tablet, Take 10 mg by mouth 2 (two) times a day before meals. , Disp: , Rfl:   ?  LANTUS SOLOSTAR U-100 INSULIN 100 unit/mL (3 mL) pen, 20 Units 2 (two) times a day. , Disp: , Rfl: 0  ?  liraglutide (VICTOZA) 0.6 mg/0.1 mL (18 mg/3 mL) PnIj injection, Inject 1.8 mg under the skin daily., Disp: , Rfl:   ?  lisinopril (PRINIVIL,ZESTRIL) 20 MG tablet, Take 20 mg by mouth 2 (two) times a day., Disp: , Rfl:   ?  propranolol (INDERAL) 20 MG tablet, Take 20 mg by mouth 2 (two) times a day., Disp: , Rfl:   ?  rivaroxaban 20  "mg Tab, Take 20 mg by mouth daily with supper., Disp: , Rfl:     Vital Signs: /72  Pulse 64  Temp 97.9  F (36.6  C) (Oral)   Resp 16  Ht 6' 2\" (1.88 m) Comment: per pt report  Wt (!) 434 lb (196.9 kg) Comment: per pt report  BMI 55.72 kg/m2    Assessment:    General:  Patient presents to clinic in no apparent distress.  Psychiatric:  Alert and oriented x3.   Lower extremity:  edema is present.    Integumentary:  Skin is uniformly warm, dry and pink.    Wound size:   Wound 18 left medial leg (Active)   Pre Size Length 0.3 2018  1:00 PM   Pre Size Width 0.5 2018  1:00 PM   Pre Size Depth 0.4 2018 12:00 PM   Pre Total Sq cm 0.6 2018 12:00 PM   Description 1.5 cm @ 10-4 2018  1:00 PM   Prodcut Used Foam 2018 12:00 PM     Vasc Edema 2018 8/3/2018 2018 8/10/2018 2018   Right just above MTP 32.5 31 29.5 (No Data) 30   Right Ankle 35 31.7 30.5 - 32   Right Widest Calf 49.6 55.7 55.8 - 56.8   Right Thigh Up 10cm 58 75.8 75 - -   Left - just above MTP 30.5 - - - -   Left Ankle 33.4 - - - -   Left Widest Calf 60.6 - - - -   Left Thigh Up 10cm 71 - - - -       Undermining is not present.    The periwoundskin is pink       Plan:         1. Patient will follow up on next Tuesday with Ana Mckeon         2. Treatment provided will include irrigation and dressings to promote autolytic debridement and will be as listed below      Cleansed with: Hibiclens     Protected skin with: Remedy Skin Repair     Dressings Applied: Collagen  (Endoform lightly packed)     Compression Applied to the Left Le-Layer     Compression Applied to the Right Leg: Patient has velcro comrpession     Offloading applied: Walker     Trial Products: no  Provider notified regarding concerns: no  Treatment Changes: no     Educational Barriers: none  Taught Regarding: Acitivity, Compliance, Compression and Dressing  Teaching Method: Exaplanation and DC sheet          "

## 2021-07-24 ENCOUNTER — HEALTH MAINTENANCE LETTER (OUTPATIENT)
Age: 61
End: 2021-07-24

## 2021-09-18 ENCOUNTER — HEALTH MAINTENANCE LETTER (OUTPATIENT)
Age: 61
End: 2021-09-18

## 2022-03-05 ENCOUNTER — HEALTH MAINTENANCE LETTER (OUTPATIENT)
Age: 62
End: 2022-03-05

## 2022-08-20 ENCOUNTER — HEALTH MAINTENANCE LETTER (OUTPATIENT)
Age: 62
End: 2022-08-20

## 2022-11-20 ENCOUNTER — HEALTH MAINTENANCE LETTER (OUTPATIENT)
Age: 62
End: 2022-11-20

## 2023-06-02 ENCOUNTER — HEALTH MAINTENANCE LETTER (OUTPATIENT)
Age: 63
End: 2023-06-02

## 2023-09-16 ENCOUNTER — HEALTH MAINTENANCE LETTER (OUTPATIENT)
Age: 63
End: 2023-09-16

## 2024-01-08 NOTE — PROGRESS NOTES
[facility]    Facility:   [facility] [[id number]]   Code Status: FULL CODE      CHIEF COMPLAINT/REASON FOR VISIT:  Chief Complaint   Patient presents with     Review Of Multiple Medical Conditions       HISTORY:      HPI: Ta is a 57 y.o. male is seen today for close follow-up.  He continues to do well as far as his leg is concerned.  He has finished his clindamycin course.  Pain is becoming less and less.    Blood sugars are for the most part beautiful.  In the mid 100s yesterday however from about lunch to dinner they went up to 200s.  He is taking glipizide 10 mg twice daily, Lantus 50 units twice daily, NovoLog sliding scale and Victoza 1.8 mg once a day.  He has not had any hypoglycemic episode.    He does get dizzy when he sits up too long.    Worked with physical therapy on the parallel bars for more than a few minutes.  Rate is controlled  It is atrial fibrillation.  He is on Xarelto.      Past Medical History:   Diagnosis Date     A-fib 12/2015     KAITLIN (acute kidney injury)      Allergic rhinitis      Cellulitis of left lower extremity      Depressive disorder      Diabetes mellitus      Dysmetabolic syndrome X      HLD (hyperlipidemia)      Hypertension      Hyponatremia      Morbid obesity      TRUE on CPAP      Sepsis      Streptococcal bacteremia      Tobacco use disorder      Varicose vein of leg              Family History   Problem Relation Age of Onset     CABG Father      Heart disease Father      Coronary artery disease Father      Emphysema Father      Diabetes Mother      Social History     Social History     Marital status: Single     Spouse name: N/A     Number of children: N/A     Years of education: N/A     Social History Main Topics     Smoking status: Former Smoker     Types: Cigarettes     Quit date: 9/19/2014     Smokeless tobacco: Never Used     Alcohol use No     Drug use: No     Sexual activity: No     Other Topics Concern     Not on  file     Social History Narrative         Review of Systems  Unremarkable  .There were no vitals filed for this visit.    Physical Exam  Vital signs noted and stable blood pressure tend to be in the low 100s.  Patient is alert, awake, oriented to time, place and person, not in acute cardiorespiratory distress  Skin: Warm, and moist,  no lesions,   Head: atraumatic, normocephalic,   Eyes: EOM's intact and conjugate, pink palpebral conjunctivae, anicteric sclerae, pupils reactive  Lungs : Clear to auscultation , no crackles, wheezes or rhonchi  Heart : Nornal first and second heart sounds, No murmurs, heaves, or thrills  Abdomen: Soft, non tender, non distended, no hepatosplenomegaly, no ascites  Extremities: I did not get to see his lymphedema today.  His lymphedema wraps will be changed on Friday.        LABS:   No results found for this or any previous visit (from the past 72 hour(s)).      ASSESSMENT:      ICD-10-CM    1. Left leg cellulitis L03.116    2. DM2 (diabetes mellitus, type 2) E11.9    3. HTN (hypertension) I10    4. Hypoxia R09.02        PLAN:    Continue with lymphedema wrapping.  Discontinue all short acting insulin.  Maintain Lantus at the current dose Victoza and glipizide.  Change hydralazine from 25 mg 4 times a day to 3 times a day as needed.  Eventually we will hope to discontinue hydralazine altogether.  Continue as needed oxygen.  Continue CPAP    Total 25 minutes of which 55% was spent counseling and coordination of care of the above plan.    Electronically signed by: Juliann Damon MD   1 day

## 2024-02-03 ENCOUNTER — HEALTH MAINTENANCE LETTER (OUTPATIENT)
Age: 64
End: 2024-02-03

## 2024-07-09 ENCOUNTER — LAB REQUISITION (OUTPATIENT)
Dept: LAB | Facility: CLINIC | Age: 64
End: 2024-07-09

## 2024-07-09 DIAGNOSIS — I10 ESSENTIAL (PRIMARY) HYPERTENSION: ICD-10-CM

## 2024-07-09 DIAGNOSIS — D64.9 ANEMIA, UNSPECIFIED: ICD-10-CM

## 2024-07-09 DIAGNOSIS — E11.9 TYPE 2 DIABETES MELLITUS WITHOUT COMPLICATIONS (H): ICD-10-CM

## 2024-07-10 LAB
ANION GAP SERPL CALCULATED.3IONS-SCNC: 16 MMOL/L (ref 7–15)
BASOPHILS # BLD AUTO: 0.1 10E3/UL (ref 0–0.2)
BASOPHILS NFR BLD AUTO: 1 %
BUN SERPL-MCNC: 86.7 MG/DL (ref 8–23)
CALCIUM SERPL-MCNC: 9.3 MG/DL (ref 8.8–10.2)
CHLORIDE SERPL-SCNC: 90 MMOL/L (ref 98–107)
CREAT SERPL-MCNC: 2.23 MG/DL (ref 0.67–1.17)
DEPRECATED HCO3 PLAS-SCNC: 29 MMOL/L (ref 22–29)
EGFRCR SERPLBLD CKD-EPI 2021: 33 ML/MIN/1.73M2
EOSINOPHIL # BLD AUTO: 0.1 10E3/UL (ref 0–0.7)
EOSINOPHIL NFR BLD AUTO: 1 %
ERYTHROCYTE [DISTWIDTH] IN BLOOD BY AUTOMATED COUNT: 16.8 % (ref 10–15)
GLUCOSE SERPL-MCNC: 270 MG/DL (ref 70–99)
HBA1C MFR BLD: 9.5 %
HCT VFR BLD AUTO: 36 % (ref 40–53)
HGB BLD-MCNC: 11.1 G/DL (ref 13.3–17.7)
IMM GRANULOCYTES # BLD: 0.4 10E3/UL
IMM GRANULOCYTES NFR BLD: 2 %
LYMPHOCYTES # BLD AUTO: 1.4 10E3/UL (ref 0.8–5.3)
LYMPHOCYTES NFR BLD AUTO: 7 %
MCH RBC QN AUTO: 23.4 PG (ref 26.5–33)
MCHC RBC AUTO-ENTMCNC: 30.8 G/DL (ref 31.5–36.5)
MCV RBC AUTO: 76 FL (ref 78–100)
MONOCYTES # BLD AUTO: 1.7 10E3/UL (ref 0–1.3)
MONOCYTES NFR BLD AUTO: 9 %
NEUTROPHILS # BLD AUTO: 15.1 10E3/UL (ref 1.6–8.3)
NEUTROPHILS NFR BLD AUTO: 80 %
NRBC # BLD AUTO: 0 10E3/UL
NRBC BLD AUTO-RTO: 0 /100
PLATELET # BLD AUTO: 388 10E3/UL (ref 150–450)
POTASSIUM SERPL-SCNC: 2.9 MMOL/L (ref 3.4–5.3)
RBC # BLD AUTO: 4.74 10E6/UL (ref 4.4–5.9)
SODIUM SERPL-SCNC: 135 MMOL/L (ref 135–145)
WBC # BLD AUTO: 18.6 10E3/UL (ref 4–11)

## 2024-07-10 PROCEDURE — P9604 ONE-WAY ALLOW PRORATED TRIP: HCPCS

## 2024-07-10 PROCEDURE — 85025 COMPLETE CBC W/AUTO DIFF WBC: CPT

## 2024-07-10 PROCEDURE — 83036 HEMOGLOBIN GLYCOSYLATED A1C: CPT

## 2024-07-10 PROCEDURE — 82565 ASSAY OF CREATININE: CPT

## 2024-07-10 PROCEDURE — 36415 COLL VENOUS BLD VENIPUNCTURE: CPT

## 2024-07-16 ENCOUNTER — LAB REQUISITION (OUTPATIENT)
Dept: LAB | Facility: CLINIC | Age: 64
End: 2024-07-16

## 2024-07-16 DIAGNOSIS — D64.9 ANEMIA, UNSPECIFIED: ICD-10-CM

## 2024-07-16 DIAGNOSIS — I10 ESSENTIAL (PRIMARY) HYPERTENSION: ICD-10-CM

## 2024-07-17 LAB
ANION GAP SERPL CALCULATED.3IONS-SCNC: 15 MMOL/L (ref 7–15)
BUN SERPL-MCNC: 77 MG/DL (ref 8–23)
CALCIUM SERPL-MCNC: 9.4 MG/DL (ref 8.8–10.4)
CHLORIDE SERPL-SCNC: 92 MMOL/L (ref 98–107)
CREAT SERPL-MCNC: 1.89 MG/DL (ref 0.67–1.17)
EGFRCR SERPLBLD CKD-EPI 2021: 40 ML/MIN/1.73M2
ERYTHROCYTE [DISTWIDTH] IN BLOOD BY AUTOMATED COUNT: 16.9 % (ref 10–15)
GLUCOSE SERPL-MCNC: 295 MG/DL (ref 70–99)
HCO3 SERPL-SCNC: 27 MMOL/L (ref 22–29)
HCT VFR BLD AUTO: 35.5 % (ref 40–53)
HGB BLD-MCNC: 10.9 G/DL (ref 13.3–17.7)
MCH RBC QN AUTO: 23.6 PG (ref 26.5–33)
MCHC RBC AUTO-ENTMCNC: 30.7 G/DL (ref 31.5–36.5)
MCV RBC AUTO: 77 FL (ref 78–100)
PLATELET # BLD AUTO: 435 10E3/UL (ref 150–450)
POTASSIUM SERPL-SCNC: 3.7 MMOL/L (ref 3.4–5.3)
RBC # BLD AUTO: 4.62 10E6/UL (ref 4.4–5.9)
SODIUM SERPL-SCNC: 134 MMOL/L (ref 135–145)
WBC # BLD AUTO: 17.9 10E3/UL (ref 4–11)

## 2024-07-17 PROCEDURE — P9603 ONE-WAY ALLOW PRORATED MILES: HCPCS | Performed by: FAMILY MEDICINE

## 2024-07-17 PROCEDURE — 85027 COMPLETE CBC AUTOMATED: CPT | Performed by: FAMILY MEDICINE

## 2024-07-17 PROCEDURE — 82565 ASSAY OF CREATININE: CPT | Performed by: FAMILY MEDICINE

## 2024-07-17 PROCEDURE — 36415 COLL VENOUS BLD VENIPUNCTURE: CPT | Performed by: FAMILY MEDICINE

## 2024-08-28 ENCOUNTER — LAB REQUISITION (OUTPATIENT)
Dept: LAB | Facility: CLINIC | Age: 64
End: 2024-08-28

## 2024-08-28 DIAGNOSIS — I50.84 END STAGE HEART FAILURE (H): ICD-10-CM

## 2024-08-28 DIAGNOSIS — M10.9 GOUT, UNSPECIFIED: ICD-10-CM

## 2024-08-28 DIAGNOSIS — N18.4 CHRONIC KIDNEY DISEASE, STAGE 4 (SEVERE) (H): ICD-10-CM

## 2024-08-28 DIAGNOSIS — M10.00 IDIOPATHIC GOUT, UNSPECIFIED SITE: ICD-10-CM

## 2024-08-30 LAB
ANION GAP SERPL CALCULATED.3IONS-SCNC: 12 MMOL/L (ref 7–15)
BUN SERPL-MCNC: 64.9 MG/DL (ref 8–23)
CALCIUM SERPL-MCNC: 9.4 MG/DL (ref 8.8–10.4)
CHLORIDE SERPL-SCNC: 100 MMOL/L (ref 98–107)
CREAT SERPL-MCNC: 1.75 MG/DL (ref 0.67–1.17)
EGFRCR SERPLBLD CKD-EPI 2021: 44 ML/MIN/1.73M2
ERYTHROCYTE [DISTWIDTH] IN BLOOD BY AUTOMATED COUNT: 18.5 % (ref 10–15)
GLUCOSE SERPL-MCNC: 101 MG/DL (ref 70–99)
HCO3 SERPL-SCNC: 31 MMOL/L (ref 22–29)
HCT VFR BLD AUTO: 43.8 % (ref 40–53)
HGB BLD-MCNC: 12.5 G/DL (ref 13.3–17.7)
MCH RBC QN AUTO: 22.9 PG (ref 26.5–33)
MCHC RBC AUTO-ENTMCNC: 28.5 G/DL (ref 31.5–36.5)
MCV RBC AUTO: 80 FL (ref 78–100)
PLATELET # BLD AUTO: 379 10E3/UL (ref 150–450)
POTASSIUM SERPL-SCNC: 3.5 MMOL/L (ref 3.4–5.3)
RBC # BLD AUTO: 5.47 10E6/UL (ref 4.4–5.9)
SODIUM SERPL-SCNC: 143 MMOL/L (ref 135–145)
WBC # BLD AUTO: 19.9 10E3/UL (ref 4–11)

## 2024-08-30 PROCEDURE — P9604 ONE-WAY ALLOW PRORATED TRIP: HCPCS

## 2024-08-30 PROCEDURE — 36415 COLL VENOUS BLD VENIPUNCTURE: CPT

## 2024-08-30 PROCEDURE — 85018 HEMOGLOBIN: CPT

## 2024-08-30 PROCEDURE — 80048 BASIC METABOLIC PNL TOTAL CA: CPT | Performed by: INTERNAL MEDICINE

## 2024-09-06 ENCOUNTER — LAB REQUISITION (OUTPATIENT)
Dept: LAB | Facility: CLINIC | Age: 64
End: 2024-09-06

## 2024-09-06 DIAGNOSIS — N18.9 CHRONIC KIDNEY DISEASE, UNSPECIFIED: ICD-10-CM

## 2024-09-09 LAB
ACANTHOCYTES BLD QL SMEAR: ABNORMAL
ANION GAP SERPL CALCULATED.3IONS-SCNC: 8 MMOL/L (ref 7–15)
AUER BODIES BLD QL SMEAR: ABNORMAL
BASO STIPL BLD QL SMEAR: ABNORMAL
BASOPHILS # BLD AUTO: 0 10E3/UL (ref 0–0.2)
BASOPHILS NFR BLD AUTO: 0 %
BITE CELLS BLD QL SMEAR: ABNORMAL
BLISTER CELLS BLD QL SMEAR: ABNORMAL
BUN SERPL-MCNC: 47.5 MG/DL (ref 8–23)
BURR CELLS BLD QL SMEAR: ABNORMAL
CALCIUM SERPL-MCNC: 8.5 MG/DL (ref 8.8–10.4)
CHLORIDE SERPL-SCNC: 101 MMOL/L (ref 98–107)
CREAT SERPL-MCNC: 1.91 MG/DL (ref 0.67–1.17)
DACRYOCYTES BLD QL SMEAR: ABNORMAL
EGFRCR SERPLBLD CKD-EPI 2021: 39 ML/MIN/1.73M2
ELLIPTOCYTES BLD QL SMEAR: SLIGHT
EOSINOPHIL # BLD AUTO: 0.1 10E3/UL (ref 0–0.7)
EOSINOPHIL NFR BLD AUTO: 1 %
ERYTHROCYTE [DISTWIDTH] IN BLOOD BY AUTOMATED COUNT: 19.8 % (ref 10–15)
FRAGMENTS BLD QL SMEAR: ABNORMAL
GLUCOSE SERPL-MCNC: 139 MG/DL (ref 70–99)
HCO3 SERPL-SCNC: 31 MMOL/L (ref 22–29)
HCT VFR BLD AUTO: 35.6 % (ref 40–53)
HGB BLD-MCNC: 10.1 G/DL (ref 13.3–17.7)
HGB C CRYSTALS: ABNORMAL
HOWELL-JOLLY BOD BLD QL SMEAR: ABNORMAL
IMM GRANULOCYTES # BLD: 0.3 10E3/UL
IMM GRANULOCYTES NFR BLD: 2 %
LYMPHOCYTES # BLD AUTO: 1.1 10E3/UL (ref 0.8–5.3)
LYMPHOCYTES NFR BLD AUTO: 9 %
MCH RBC QN AUTO: 23.5 PG (ref 26.5–33)
MCHC RBC AUTO-ENTMCNC: 28.4 G/DL (ref 31.5–36.5)
MCV RBC AUTO: 83 FL (ref 78–100)
MONOCYTES # BLD AUTO: 0.8 10E3/UL (ref 0–1.3)
MONOCYTES NFR BLD AUTO: 7 %
NEUTROPHILS # BLD AUTO: 10.3 10E3/UL (ref 1.6–8.3)
NEUTROPHILS NFR BLD AUTO: 81 %
NEUTS HYPERSEG BLD QL SMEAR: ABNORMAL
NRBC # BLD AUTO: 0 10E3/UL
NRBC BLD AUTO-RTO: 0 /100
PLAT MORPH BLD: ABNORMAL
PLATELET # BLD AUTO: 205 10E3/UL (ref 150–450)
POLYCHROMASIA BLD QL SMEAR: ABNORMAL
POTASSIUM SERPL-SCNC: 4.2 MMOL/L (ref 3.4–5.3)
RBC # BLD AUTO: 4.3 10E6/UL (ref 4.4–5.9)
RBC AGGLUT BLD QL: ABNORMAL
RBC MORPH BLD: ABNORMAL
ROULEAUX BLD QL SMEAR: ABNORMAL
SICKLE CELLS BLD QL SMEAR: ABNORMAL
SMUDGE CELLS BLD QL SMEAR: ABNORMAL
SODIUM SERPL-SCNC: 140 MMOL/L (ref 135–145)
SPHEROCYTES BLD QL SMEAR: ABNORMAL
STOMATOCYTES BLD QL SMEAR: ABNORMAL
TARGETS BLD QL SMEAR: ABNORMAL
TOXIC GRANULES BLD QL SMEAR: ABNORMAL
VARIANT LYMPHS BLD QL SMEAR: ABNORMAL
WBC # BLD AUTO: 12.7 10E3/UL (ref 4–11)

## 2024-09-09 PROCEDURE — P9604 ONE-WAY ALLOW PRORATED TRIP: HCPCS | Performed by: INTERNAL MEDICINE

## 2024-09-09 PROCEDURE — 82565 ASSAY OF CREATININE: CPT | Performed by: INTERNAL MEDICINE

## 2024-09-09 PROCEDURE — 82374 ASSAY BLOOD CARBON DIOXIDE: CPT | Performed by: INTERNAL MEDICINE

## 2024-09-09 PROCEDURE — 85025 COMPLETE CBC W/AUTO DIFF WBC: CPT | Performed by: INTERNAL MEDICINE

## 2024-09-09 PROCEDURE — 36415 COLL VENOUS BLD VENIPUNCTURE: CPT | Performed by: INTERNAL MEDICINE

## 2024-09-11 ENCOUNTER — LAB REQUISITION (OUTPATIENT)
Dept: LAB | Facility: CLINIC | Age: 64
End: 2024-09-11

## 2024-09-11 DIAGNOSIS — I10 ESSENTIAL (PRIMARY) HYPERTENSION: ICD-10-CM

## 2024-09-11 DIAGNOSIS — D72.829 ELEVATED WHITE BLOOD CELL COUNT, UNSPECIFIED: ICD-10-CM

## 2024-09-16 LAB
ANION GAP SERPL CALCULATED.3IONS-SCNC: 7 MMOL/L (ref 7–15)
BUN SERPL-MCNC: 45.8 MG/DL (ref 8–23)
CALCIUM SERPL-MCNC: 9.3 MG/DL (ref 8.8–10.4)
CHLORIDE SERPL-SCNC: 97 MMOL/L (ref 98–107)
CREAT SERPL-MCNC: 1.68 MG/DL (ref 0.67–1.17)
EGFRCR SERPLBLD CKD-EPI 2021: 46 ML/MIN/1.73M2
ERYTHROCYTE [DISTWIDTH] IN BLOOD BY AUTOMATED COUNT: 18.9 % (ref 10–15)
GLUCOSE SERPL-MCNC: 201 MG/DL (ref 70–99)
HCO3 SERPL-SCNC: 34 MMOL/L (ref 22–29)
HCT VFR BLD AUTO: 36.8 % (ref 40–53)
HGB BLD-MCNC: 10.6 G/DL (ref 13.3–17.7)
MCH RBC QN AUTO: 23.9 PG (ref 26.5–33)
MCHC RBC AUTO-ENTMCNC: 28.8 G/DL (ref 31.5–36.5)
MCV RBC AUTO: 83 FL (ref 78–100)
PLATELET # BLD AUTO: 222 10E3/UL (ref 150–450)
POTASSIUM SERPL-SCNC: 4.9 MMOL/L (ref 3.4–5.3)
RBC # BLD AUTO: 4.44 10E6/UL (ref 4.4–5.9)
SODIUM SERPL-SCNC: 138 MMOL/L (ref 135–145)
WBC # BLD AUTO: 15.9 10E3/UL (ref 4–11)

## 2024-09-16 PROCEDURE — 85018 HEMOGLOBIN: CPT

## 2024-09-16 PROCEDURE — 82435 ASSAY OF BLOOD CHLORIDE: CPT

## 2024-09-16 PROCEDURE — 80048 BASIC METABOLIC PNL TOTAL CA: CPT

## 2024-09-16 PROCEDURE — P9604 ONE-WAY ALLOW PRORATED TRIP: HCPCS

## 2024-09-16 PROCEDURE — 36415 COLL VENOUS BLD VENIPUNCTURE: CPT

## 2024-09-16 PROCEDURE — 82947 ASSAY GLUCOSE BLOOD QUANT: CPT

## 2024-09-26 ENCOUNTER — LAB REQUISITION (OUTPATIENT)
Dept: LAB | Facility: CLINIC | Age: 64
End: 2024-09-26

## 2024-09-26 DIAGNOSIS — N18.31 CHRONIC KIDNEY DISEASE, STAGE 3A (H): ICD-10-CM

## 2024-09-26 DIAGNOSIS — D64.9 ANEMIA, UNSPECIFIED: ICD-10-CM

## 2024-09-30 LAB
ANION GAP SERPL CALCULATED.3IONS-SCNC: 11 MMOL/L (ref 7–15)
BASOPHILS # BLD AUTO: 0.1 10E3/UL (ref 0–0.2)
BASOPHILS NFR BLD AUTO: 0 %
BUN SERPL-MCNC: 35.6 MG/DL (ref 8–23)
CALCIUM SERPL-MCNC: 9.3 MG/DL (ref 8.8–10.4)
CHLORIDE SERPL-SCNC: 96 MMOL/L (ref 98–107)
CREAT SERPL-MCNC: 1.76 MG/DL (ref 0.67–1.17)
EGFRCR SERPLBLD CKD-EPI 2021: 43 ML/MIN/1.73M2
EOSINOPHIL # BLD AUTO: 0.1 10E3/UL (ref 0–0.7)
EOSINOPHIL NFR BLD AUTO: 1 %
ERYTHROCYTE [DISTWIDTH] IN BLOOD BY AUTOMATED COUNT: 22 % (ref 10–15)
GLUCOSE SERPL-MCNC: 182 MG/DL (ref 70–99)
HCO3 SERPL-SCNC: 32 MMOL/L (ref 22–29)
HCT VFR BLD AUTO: 37.9 % (ref 40–53)
HGB BLD-MCNC: 11.1 G/DL (ref 13.3–17.7)
IMM GRANULOCYTES # BLD: 0.2 10E3/UL
IMM GRANULOCYTES NFR BLD: 2 %
LYMPHOCYTES # BLD AUTO: 1.1 10E3/UL (ref 0.8–5.3)
LYMPHOCYTES NFR BLD AUTO: 9 %
MCH RBC QN AUTO: 24.9 PG (ref 26.5–33)
MCHC RBC AUTO-ENTMCNC: 29.3 G/DL (ref 31.5–36.5)
MCV RBC AUTO: 85 FL (ref 78–100)
MONOCYTES # BLD AUTO: 1 10E3/UL (ref 0–1.3)
MONOCYTES NFR BLD AUTO: 8 %
NEUTROPHILS # BLD AUTO: 9.5 10E3/UL (ref 1.6–8.3)
NEUTROPHILS NFR BLD AUTO: 79 %
NRBC # BLD AUTO: 0 10E3/UL
NRBC BLD AUTO-RTO: 0 /100
PLATELET # BLD AUTO: 261 10E3/UL (ref 150–450)
POTASSIUM SERPL-SCNC: 4 MMOL/L (ref 3.4–5.3)
RBC # BLD AUTO: 4.46 10E6/UL (ref 4.4–5.9)
SODIUM SERPL-SCNC: 139 MMOL/L (ref 135–145)
WBC # BLD AUTO: 12 10E3/UL (ref 4–11)

## 2024-09-30 PROCEDURE — 82310 ASSAY OF CALCIUM: CPT | Performed by: INTERNAL MEDICINE

## 2024-09-30 PROCEDURE — P9604 ONE-WAY ALLOW PRORATED TRIP: HCPCS | Performed by: INTERNAL MEDICINE

## 2024-09-30 PROCEDURE — 80048 BASIC METABOLIC PNL TOTAL CA: CPT | Performed by: INTERNAL MEDICINE

## 2024-09-30 PROCEDURE — 36415 COLL VENOUS BLD VENIPUNCTURE: CPT | Performed by: INTERNAL MEDICINE

## 2024-09-30 PROCEDURE — 85025 COMPLETE CBC W/AUTO DIFF WBC: CPT | Performed by: INTERNAL MEDICINE

## 2024-10-18 ENCOUNTER — LAB REQUISITION (OUTPATIENT)
Dept: LAB | Facility: CLINIC | Age: 64
End: 2024-10-18
Payer: COMMERCIAL

## 2024-10-18 DIAGNOSIS — R79.89 OTHER SPECIFIED ABNORMAL FINDINGS OF BLOOD CHEMISTRY: ICD-10-CM

## 2024-10-18 DIAGNOSIS — R68.89 OTHER GENERAL SYMPTOMS AND SIGNS: ICD-10-CM

## 2024-10-21 LAB
ANION GAP SERPL CALCULATED.3IONS-SCNC: 11 MMOL/L (ref 7–15)
BASOPHILS # BLD AUTO: 0.1 10E3/UL (ref 0–0.2)
BASOPHILS NFR BLD AUTO: 1 %
BUN SERPL-MCNC: 41.5 MG/DL (ref 8–23)
CALCIUM SERPL-MCNC: 8.9 MG/DL (ref 8.8–10.4)
CHLORIDE SERPL-SCNC: 101 MMOL/L (ref 98–107)
CREAT SERPL-MCNC: 1.98 MG/DL (ref 0.67–1.17)
EGFRCR SERPLBLD CKD-EPI 2021: 37 ML/MIN/1.73M2
EOSINOPHIL # BLD AUTO: 0.1 10E3/UL (ref 0–0.7)
EOSINOPHIL NFR BLD AUTO: 1 %
ERYTHROCYTE [DISTWIDTH] IN BLOOD BY AUTOMATED COUNT: 20.6 % (ref 10–15)
GLUCOSE SERPL-MCNC: 164 MG/DL (ref 70–99)
HCO3 SERPL-SCNC: 29 MMOL/L (ref 22–29)
HCT VFR BLD AUTO: 36.2 % (ref 40–53)
HGB BLD-MCNC: 10.6 G/DL (ref 13.3–17.7)
IMM GRANULOCYTES # BLD: 0.9 10E3/UL
IMM GRANULOCYTES NFR BLD: 5 %
LYMPHOCYTES # BLD AUTO: 1.2 10E3/UL (ref 0.8–5.3)
LYMPHOCYTES NFR BLD AUTO: 7 %
MCH RBC QN AUTO: 25.7 PG (ref 26.5–33)
MCHC RBC AUTO-ENTMCNC: 29.3 G/DL (ref 31.5–36.5)
MCV RBC AUTO: 88 FL (ref 78–100)
MONOCYTES # BLD AUTO: 0.9 10E3/UL (ref 0–1.3)
MONOCYTES NFR BLD AUTO: 5 %
NEUTROPHILS # BLD AUTO: 13.6 10E3/UL (ref 1.6–8.3)
NEUTROPHILS NFR BLD AUTO: 81 %
NRBC # BLD AUTO: 0 10E3/UL
NRBC BLD AUTO-RTO: 0 /100
PLATELET # BLD AUTO: 228 10E3/UL (ref 150–450)
POTASSIUM SERPL-SCNC: 3.9 MMOL/L (ref 3.4–5.3)
RBC # BLD AUTO: 4.12 10E6/UL (ref 4.4–5.9)
SODIUM SERPL-SCNC: 141 MMOL/L (ref 135–145)
WBC # BLD AUTO: 16.9 10E3/UL (ref 4–11)

## 2024-10-21 PROCEDURE — 85025 COMPLETE CBC W/AUTO DIFF WBC: CPT | Performed by: INTERNAL MEDICINE

## 2024-10-21 PROCEDURE — 80048 BASIC METABOLIC PNL TOTAL CA: CPT | Performed by: INTERNAL MEDICINE

## 2024-10-21 PROCEDURE — 36415 COLL VENOUS BLD VENIPUNCTURE: CPT | Mod: ORL | Performed by: INTERNAL MEDICINE

## 2024-10-21 PROCEDURE — 80048 BASIC METABOLIC PNL TOTAL CA: CPT | Mod: ORL | Performed by: INTERNAL MEDICINE

## 2024-10-24 ENCOUNTER — LAB REQUISITION (OUTPATIENT)
Dept: LAB | Facility: CLINIC | Age: 64
End: 2024-10-24
Payer: COMMERCIAL

## 2024-10-24 DIAGNOSIS — I50.23 ACUTE ON CHRONIC SYSTOLIC (CONGESTIVE) HEART FAILURE (H): ICD-10-CM

## 2024-10-24 DIAGNOSIS — D64.9 ANEMIA, UNSPECIFIED: ICD-10-CM

## 2024-10-28 LAB
ANION GAP SERPL CALCULATED.3IONS-SCNC: 14 MMOL/L (ref 7–15)
BUN SERPL-MCNC: 41.6 MG/DL (ref 8–23)
CALCIUM SERPL-MCNC: 8.8 MG/DL (ref 8.8–10.4)
CHLORIDE SERPL-SCNC: 96 MMOL/L (ref 98–107)
CREAT SERPL-MCNC: 2.12 MG/DL (ref 0.67–1.17)
EGFRCR SERPLBLD CKD-EPI 2021: 35 ML/MIN/1.73M2
ERYTHROCYTE [DISTWIDTH] IN BLOOD BY AUTOMATED COUNT: 19.8 % (ref 10–15)
GLUCOSE SERPL-MCNC: 64 MG/DL (ref 70–99)
HCO3 SERPL-SCNC: 28 MMOL/L (ref 22–29)
HCT VFR BLD AUTO: 36.2 % (ref 40–53)
HGB BLD-MCNC: 10.9 G/DL (ref 13.3–17.7)
MCH RBC QN AUTO: 26.4 PG (ref 26.5–33)
MCHC RBC AUTO-ENTMCNC: 30.1 G/DL (ref 31.5–36.5)
MCV RBC AUTO: 88 FL (ref 78–100)
NT-PROBNP SERPL-MCNC: 4117 PG/ML (ref 0–900)
PLATELET # BLD AUTO: 293 10E3/UL (ref 150–450)
POTASSIUM SERPL-SCNC: 3 MMOL/L (ref 3.4–5.3)
RBC # BLD AUTO: 4.13 10E6/UL (ref 4.4–5.9)
SODIUM SERPL-SCNC: 138 MMOL/L (ref 135–145)
WBC # BLD AUTO: 21.4 10E3/UL (ref 4–11)

## 2024-10-28 PROCEDURE — 85027 COMPLETE CBC AUTOMATED: CPT | Mod: ORL | Performed by: INTERNAL MEDICINE

## 2024-10-28 PROCEDURE — 36415 COLL VENOUS BLD VENIPUNCTURE: CPT | Mod: ORL | Performed by: INTERNAL MEDICINE

## 2024-10-28 PROCEDURE — P9604 ONE-WAY ALLOW PRORATED TRIP: HCPCS | Mod: ORL | Performed by: INTERNAL MEDICINE

## 2024-10-28 PROCEDURE — 83880 ASSAY OF NATRIURETIC PEPTIDE: CPT | Mod: ORL | Performed by: INTERNAL MEDICINE

## 2024-10-28 PROCEDURE — 80048 BASIC METABOLIC PNL TOTAL CA: CPT | Mod: ORL | Performed by: INTERNAL MEDICINE

## 2024-10-31 ENCOUNTER — LAB REQUISITION (OUTPATIENT)
Dept: LAB | Facility: CLINIC | Age: 64
End: 2024-10-31
Payer: COMMERCIAL

## 2024-10-31 DIAGNOSIS — R53.1 WEAKNESS: ICD-10-CM

## 2024-10-31 DIAGNOSIS — N18.4 CHRONIC KIDNEY DISEASE, STAGE 4 (SEVERE) (H): ICD-10-CM

## 2024-10-31 PROCEDURE — 81001 URINALYSIS AUTO W/SCOPE: CPT | Mod: ORL | Performed by: INTERNAL MEDICINE

## 2024-11-01 ENCOUNTER — LAB REQUISITION (OUTPATIENT)
Dept: LAB | Facility: CLINIC | Age: 64
End: 2024-11-01
Payer: COMMERCIAL

## 2024-11-01 DIAGNOSIS — N39.0 URINARY TRACT INFECTION, SITE NOT SPECIFIED: ICD-10-CM

## 2024-11-01 LAB
ALBUMIN UR-MCNC: 10 MG/DL
APPEARANCE UR: CLEAR
BILIRUB UR QL STRIP: NEGATIVE
COLOR UR AUTO: ABNORMAL
GLUCOSE UR STRIP-MCNC: 50 MG/DL
HGB UR QL STRIP: NEGATIVE
HYALINE CASTS: 12 /LPF
KETONES UR STRIP-MCNC: NEGATIVE MG/DL
LEUKOCYTE ESTERASE UR QL STRIP: NEGATIVE
MUCOUS THREADS #/AREA URNS LPF: PRESENT /LPF
NITRATE UR QL: NEGATIVE
PH UR STRIP: 5.5 [PH] (ref 5–7)
RBC URINE: 0 /HPF
SP GR UR STRIP: 1.01 (ref 1–1.03)
SQUAMOUS EPITHELIAL: <1 /HPF
UROBILINOGEN UR STRIP-MCNC: NORMAL MG/DL
WBC URINE: <1 /HPF

## 2024-11-04 LAB
ANION GAP SERPL CALCULATED.3IONS-SCNC: 12 MMOL/L (ref 7–15)
BUN SERPL-MCNC: 52.1 MG/DL (ref 8–23)
CALCIUM SERPL-MCNC: 9.6 MG/DL (ref 8.8–10.4)
CHLORIDE SERPL-SCNC: 95 MMOL/L (ref 98–107)
CREAT SERPL-MCNC: 2.29 MG/DL (ref 0.67–1.17)
EGFRCR SERPLBLD CKD-EPI 2021: 31 ML/MIN/1.73M2
ERYTHROCYTE [DISTWIDTH] IN BLOOD BY AUTOMATED COUNT: 18.1 % (ref 10–15)
GLUCOSE SERPL-MCNC: 129 MG/DL (ref 70–99)
HCO3 SERPL-SCNC: 33 MMOL/L (ref 22–29)
HCT VFR BLD AUTO: 39.2 % (ref 40–53)
HGB BLD-MCNC: 11.6 G/DL (ref 13.3–17.7)
MCH RBC QN AUTO: 26.2 PG (ref 26.5–33)
MCHC RBC AUTO-ENTMCNC: 29.6 G/DL (ref 31.5–36.5)
MCV RBC AUTO: 89 FL (ref 78–100)
PLATELET # BLD AUTO: 408 10E3/UL (ref 150–450)
POTASSIUM SERPL-SCNC: 4.2 MMOL/L (ref 3.4–5.3)
RBC # BLD AUTO: 4.43 10E6/UL (ref 4.4–5.9)
SODIUM SERPL-SCNC: 140 MMOL/L (ref 135–145)
WBC # BLD AUTO: 15 10E3/UL (ref 4–11)

## 2024-11-04 PROCEDURE — 36415 COLL VENOUS BLD VENIPUNCTURE: CPT | Mod: ORL | Performed by: INTERNAL MEDICINE

## 2024-11-04 PROCEDURE — P9604 ONE-WAY ALLOW PRORATED TRIP: HCPCS | Mod: ORL | Performed by: INTERNAL MEDICINE

## 2024-11-04 PROCEDURE — 80048 BASIC METABOLIC PNL TOTAL CA: CPT | Mod: ORL | Performed by: INTERNAL MEDICINE

## 2024-11-04 PROCEDURE — 85027 COMPLETE CBC AUTOMATED: CPT | Mod: ORL | Performed by: INTERNAL MEDICINE

## 2024-11-22 ENCOUNTER — LAB REQUISITION (OUTPATIENT)
Dept: LAB | Facility: CLINIC | Age: 64
End: 2024-11-22
Payer: COMMERCIAL

## 2024-11-22 DIAGNOSIS — M10.9 GOUT, UNSPECIFIED: ICD-10-CM

## 2024-11-22 DIAGNOSIS — L03.90 CELLULITIS, UNSPECIFIED: ICD-10-CM

## 2024-11-26 LAB
BASOPHILS # BLD AUTO: 0.1 10E3/UL (ref 0–0.2)
BASOPHILS NFR BLD AUTO: 1 %
CRP SERPL-MCNC: 16.1 MG/L
EOSINOPHIL # BLD AUTO: 0.6 10E3/UL (ref 0–0.7)
EOSINOPHIL NFR BLD AUTO: 3 %
ERYTHROCYTE [DISTWIDTH] IN BLOOD BY AUTOMATED COUNT: 17.2 % (ref 10–15)
HCT VFR BLD AUTO: 35.2 % (ref 40–53)
HGB BLD-MCNC: 10.7 G/DL (ref 13.3–17.7)
IMM GRANULOCYTES # BLD: 0.3 10E3/UL
IMM GRANULOCYTES NFR BLD: 2 %
LYMPHOCYTES # BLD AUTO: 1.1 10E3/UL (ref 0.8–5.3)
LYMPHOCYTES NFR BLD AUTO: 7 %
MCH RBC QN AUTO: 26.7 PG (ref 26.5–33)
MCHC RBC AUTO-ENTMCNC: 30.4 G/DL (ref 31.5–36.5)
MCV RBC AUTO: 88 FL (ref 78–100)
MONOCYTES # BLD AUTO: 1.1 10E3/UL (ref 0–1.3)
MONOCYTES NFR BLD AUTO: 6 %
NEUTROPHILS # BLD AUTO: 14.1 10E3/UL (ref 1.6–8.3)
NEUTROPHILS NFR BLD AUTO: 81 %
NRBC # BLD AUTO: 0 10E3/UL
NRBC BLD AUTO-RTO: 0 /100
PLATELET # BLD AUTO: 293 10E3/UL (ref 150–450)
RBC # BLD AUTO: 4.01 10E6/UL (ref 4.4–5.9)
URATE SERPL-MCNC: 9.8 MG/DL (ref 3.4–7)
WBC # BLD AUTO: 17.4 10E3/UL (ref 4–11)

## 2024-11-28 ENCOUNTER — LAB REQUISITION (OUTPATIENT)
Dept: LAB | Facility: CLINIC | Age: 64
End: 2024-11-28
Payer: COMMERCIAL

## 2024-11-28 DIAGNOSIS — M10.9 GOUT, UNSPECIFIED: ICD-10-CM

## 2024-11-28 DIAGNOSIS — N18.30 CHRONIC KIDNEY DISEASE, STAGE 3 UNSPECIFIED (H): ICD-10-CM

## 2024-11-28 DIAGNOSIS — I10 ESSENTIAL (PRIMARY) HYPERTENSION: ICD-10-CM

## 2024-12-02 LAB
ANION GAP SERPL CALCULATED.3IONS-SCNC: 8 MMOL/L (ref 7–15)
BUN SERPL-MCNC: 51.1 MG/DL (ref 8–23)
CALCIUM SERPL-MCNC: 9.2 MG/DL (ref 8.8–10.4)
CHLORIDE SERPL-SCNC: 94 MMOL/L (ref 98–107)
CREAT SERPL-MCNC: 2.23 MG/DL (ref 0.67–1.17)
CRP SERPL-MCNC: 61.6 MG/L
EGFRCR SERPLBLD CKD-EPI 2021: 33 ML/MIN/1.73M2
ERYTHROCYTE [DISTWIDTH] IN BLOOD BY AUTOMATED COUNT: 16.5 % (ref 10–15)
GLUCOSE SERPL-MCNC: 259 MG/DL (ref 70–99)
HCO3 SERPL-SCNC: 35 MMOL/L (ref 22–29)
HCT VFR BLD AUTO: 35.1 % (ref 40–53)
HGB BLD-MCNC: 10.8 G/DL (ref 13.3–17.7)
MCH RBC QN AUTO: 26.5 PG (ref 26.5–33)
MCHC RBC AUTO-ENTMCNC: 30.8 G/DL (ref 31.5–36.5)
MCV RBC AUTO: 86 FL (ref 78–100)
PLATELET # BLD AUTO: 249 10E3/UL (ref 150–450)
POTASSIUM SERPL-SCNC: 3.9 MMOL/L (ref 3.4–5.3)
RBC # BLD AUTO: 4.07 10E6/UL (ref 4.4–5.9)
SODIUM SERPL-SCNC: 137 MMOL/L (ref 135–145)
WBC # BLD AUTO: 15.5 10E3/UL (ref 4–11)

## 2024-12-02 PROCEDURE — 85027 COMPLETE CBC AUTOMATED: CPT | Mod: ORL | Performed by: INTERNAL MEDICINE

## 2024-12-02 PROCEDURE — 36415 COLL VENOUS BLD VENIPUNCTURE: CPT | Mod: ORL | Performed by: INTERNAL MEDICINE

## 2024-12-02 PROCEDURE — 80048 BASIC METABOLIC PNL TOTAL CA: CPT | Mod: ORL | Performed by: INTERNAL MEDICINE

## 2024-12-02 PROCEDURE — P9604 ONE-WAY ALLOW PRORATED TRIP: HCPCS | Mod: ORL | Performed by: INTERNAL MEDICINE

## 2024-12-02 PROCEDURE — 86140 C-REACTIVE PROTEIN: CPT | Mod: ORL | Performed by: INTERNAL MEDICINE

## 2024-12-13 ENCOUNTER — LAB REQUISITION (OUTPATIENT)
Dept: LAB | Facility: CLINIC | Age: 64
End: 2024-12-13
Payer: COMMERCIAL

## 2024-12-13 DIAGNOSIS — I50.23 ACUTE ON CHRONIC SYSTOLIC (CONGESTIVE) HEART FAILURE (H): ICD-10-CM

## 2024-12-16 LAB
ANION GAP SERPL CALCULATED.3IONS-SCNC: 14 MMOL/L (ref 7–15)
BUN SERPL-MCNC: 63.6 MG/DL (ref 8–23)
CALCIUM SERPL-MCNC: 9.2 MG/DL (ref 8.8–10.4)
CHLORIDE SERPL-SCNC: 103 MMOL/L (ref 98–107)
CREAT SERPL-MCNC: 1.89 MG/DL (ref 0.67–1.17)
EGFRCR SERPLBLD CKD-EPI 2021: 40 ML/MIN/1.73M2
GLUCOSE SERPL-MCNC: 74 MG/DL (ref 70–99)
HCO3 SERPL-SCNC: 27 MMOL/L (ref 22–29)
POTASSIUM SERPL-SCNC: 3.4 MMOL/L (ref 3.4–5.3)
SODIUM SERPL-SCNC: 144 MMOL/L (ref 135–145)

## 2024-12-16 PROCEDURE — 36415 COLL VENOUS BLD VENIPUNCTURE: CPT | Mod: ORL | Performed by: INTERNAL MEDICINE

## 2024-12-16 PROCEDURE — P9604 ONE-WAY ALLOW PRORATED TRIP: HCPCS | Mod: ORL | Performed by: INTERNAL MEDICINE

## 2024-12-16 PROCEDURE — 80048 BASIC METABOLIC PNL TOTAL CA: CPT | Mod: ORL | Performed by: INTERNAL MEDICINE

## 2024-12-27 ENCOUNTER — LAB REQUISITION (OUTPATIENT)
Dept: LAB | Facility: CLINIC | Age: 64
End: 2024-12-27
Payer: COMMERCIAL

## 2024-12-27 DIAGNOSIS — I25.10 ATHEROSCLEROTIC HEART DISEASE OF NATIVE CORONARY ARTERY WITHOUT ANGINA PECTORIS: ICD-10-CM

## 2024-12-30 LAB
ANION GAP SERPL CALCULATED.3IONS-SCNC: 15 MMOL/L (ref 7–15)
BUN SERPL-MCNC: 56.1 MG/DL (ref 8–23)
CALCIUM SERPL-MCNC: 9.2 MG/DL (ref 8.8–10.4)
CHLORIDE SERPL-SCNC: 92 MMOL/L (ref 98–107)
CREAT SERPL-MCNC: 2.22 MG/DL (ref 0.67–1.17)
EGFRCR SERPLBLD CKD-EPI 2021: 33 ML/MIN/1.73M2
GLUCOSE SERPL-MCNC: 117 MG/DL (ref 70–99)
HCO3 SERPL-SCNC: 30 MMOL/L (ref 22–29)
POTASSIUM SERPL-SCNC: 3.1 MMOL/L (ref 3.4–5.3)
SODIUM SERPL-SCNC: 137 MMOL/L (ref 135–145)

## 2024-12-30 PROCEDURE — P9604 ONE-WAY ALLOW PRORATED TRIP: HCPCS | Mod: ORL | Performed by: INTERNAL MEDICINE

## 2024-12-30 PROCEDURE — 80048 BASIC METABOLIC PNL TOTAL CA: CPT | Mod: ORL | Performed by: INTERNAL MEDICINE

## 2024-12-30 PROCEDURE — 36415 COLL VENOUS BLD VENIPUNCTURE: CPT | Mod: ORL | Performed by: INTERNAL MEDICINE

## 2025-01-01 ENCOUNTER — LAB REQUISITION (OUTPATIENT)
Dept: LAB | Facility: CLINIC | Age: 65
End: 2025-01-01
Payer: COMMERCIAL

## 2025-01-01 DIAGNOSIS — N18.31 CHRONIC KIDNEY DISEASE, STAGE 3A (H): ICD-10-CM

## 2025-01-03 LAB
ANION GAP SERPL CALCULATED.3IONS-SCNC: 15 MMOL/L (ref 7–15)
BUN SERPL-MCNC: 58.5 MG/DL (ref 8–23)
CALCIUM SERPL-MCNC: 9.3 MG/DL (ref 8.8–10.4)
CHLORIDE SERPL-SCNC: 89 MMOL/L (ref 98–107)
CREAT SERPL-MCNC: 2.15 MG/DL (ref 0.67–1.17)
EGFRCR SERPLBLD CKD-EPI 2021: 34 ML/MIN/1.73M2
GLUCOSE SERPL-MCNC: 161 MG/DL (ref 70–99)
HCO3 SERPL-SCNC: 33 MMOL/L (ref 22–29)
POTASSIUM SERPL-SCNC: 3.2 MMOL/L (ref 3.4–5.3)
SODIUM SERPL-SCNC: 137 MMOL/L (ref 135–145)

## 2025-01-03 PROCEDURE — P9604 ONE-WAY ALLOW PRORATED TRIP: HCPCS | Mod: ORL | Performed by: INTERNAL MEDICINE

## 2025-01-03 PROCEDURE — 36415 COLL VENOUS BLD VENIPUNCTURE: CPT | Mod: ORL | Performed by: INTERNAL MEDICINE

## 2025-01-03 PROCEDURE — 80048 BASIC METABOLIC PNL TOTAL CA: CPT | Mod: ORL | Performed by: INTERNAL MEDICINE

## 2025-01-10 ENCOUNTER — LAB REQUISITION (OUTPATIENT)
Dept: LAB | Facility: CLINIC | Age: 65
End: 2025-01-10
Payer: COMMERCIAL

## 2025-01-10 DIAGNOSIS — I50.23 ACUTE ON CHRONIC SYSTOLIC (CONGESTIVE) HEART FAILURE (H): ICD-10-CM

## 2025-01-13 LAB
ANION GAP SERPL CALCULATED.3IONS-SCNC: 13 MMOL/L (ref 7–15)
BUN SERPL-MCNC: 67.8 MG/DL (ref 8–23)
CALCIUM SERPL-MCNC: 9.4 MG/DL (ref 8.8–10.4)
CHLORIDE SERPL-SCNC: 95 MMOL/L (ref 98–107)
CREAT SERPL-MCNC: 2.17 MG/DL (ref 0.67–1.17)
EGFRCR SERPLBLD CKD-EPI 2021: 34 ML/MIN/1.73M2
GLUCOSE SERPL-MCNC: 182 MG/DL (ref 70–99)
HCO3 SERPL-SCNC: 31 MMOL/L (ref 22–29)
POTASSIUM SERPL-SCNC: 3.2 MMOL/L (ref 3.4–5.3)
SODIUM SERPL-SCNC: 139 MMOL/L (ref 135–145)

## 2025-01-13 PROCEDURE — P9604 ONE-WAY ALLOW PRORATED TRIP: HCPCS | Mod: ORL | Performed by: INTERNAL MEDICINE

## 2025-01-13 PROCEDURE — 36415 COLL VENOUS BLD VENIPUNCTURE: CPT | Mod: ORL | Performed by: INTERNAL MEDICINE

## 2025-01-13 PROCEDURE — 80048 BASIC METABOLIC PNL TOTAL CA: CPT | Mod: ORL | Performed by: INTERNAL MEDICINE

## 2025-01-15 ENCOUNTER — LAB REQUISITION (OUTPATIENT)
Dept: LAB | Facility: CLINIC | Age: 65
End: 2025-01-15
Payer: COMMERCIAL

## 2025-01-15 DIAGNOSIS — E87.6 HYPOKALEMIA: ICD-10-CM

## 2025-01-17 LAB — POTASSIUM SERPL-SCNC: 4.3 MMOL/L (ref 3.4–5.3)

## 2025-01-17 PROCEDURE — 36415 COLL VENOUS BLD VENIPUNCTURE: CPT | Mod: ORL | Performed by: INTERNAL MEDICINE

## 2025-01-17 PROCEDURE — P9604 ONE-WAY ALLOW PRORATED TRIP: HCPCS | Mod: ORL | Performed by: INTERNAL MEDICINE

## 2025-01-17 PROCEDURE — 84132 ASSAY OF SERUM POTASSIUM: CPT | Mod: ORL | Performed by: INTERNAL MEDICINE

## 2025-02-06 ENCOUNTER — LAB REQUISITION (OUTPATIENT)
Dept: LAB | Facility: CLINIC | Age: 65
End: 2025-02-06
Payer: COMMERCIAL

## 2025-02-06 DIAGNOSIS — Z51.81 ENCOUNTER FOR THERAPEUTIC DRUG LEVEL MONITORING: ICD-10-CM

## 2025-02-07 ENCOUNTER — LAB REQUISITION (OUTPATIENT)
Dept: LAB | Facility: CLINIC | Age: 65
End: 2025-02-07
Payer: COMMERCIAL

## 2025-02-07 DIAGNOSIS — R11.2 NAUSEA WITH VOMITING, UNSPECIFIED: ICD-10-CM

## 2025-02-07 LAB
ALBUMIN UR-MCNC: NEGATIVE MG/DL
ANION GAP SERPL CALCULATED.3IONS-SCNC: 18 MMOL/L (ref 7–15)
APPEARANCE UR: CLEAR
BILIRUB UR QL STRIP: NEGATIVE
BUN SERPL-MCNC: 63.2 MG/DL (ref 8–23)
CALCIUM SERPL-MCNC: 9.3 MG/DL (ref 8.8–10.4)
CHLORIDE SERPL-SCNC: 94 MMOL/L (ref 98–107)
COLOR UR AUTO: ABNORMAL
CREAT SERPL-MCNC: 2.35 MG/DL (ref 0.67–1.17)
EGFRCR SERPLBLD CKD-EPI 2021: 31 ML/MIN/1.73M2
ERYTHROCYTE [DISTWIDTH] IN BLOOD BY AUTOMATED COUNT: 17.1 % (ref 10–15)
GLUCOSE SERPL-MCNC: 101 MG/DL (ref 70–99)
GLUCOSE UR STRIP-MCNC: NEGATIVE MG/DL
HCO3 SERPL-SCNC: 26 MMOL/L (ref 22–29)
HCT VFR BLD AUTO: 34.5 % (ref 40–53)
HGB BLD-MCNC: 10.1 G/DL (ref 13.3–17.7)
HGB UR QL STRIP: NEGATIVE
HYALINE CASTS: 10 /LPF
KETONES UR STRIP-MCNC: NEGATIVE MG/DL
LEUKOCYTE ESTERASE UR QL STRIP: NEGATIVE
MCH RBC QN AUTO: 24.6 PG (ref 26.5–33)
MCHC RBC AUTO-ENTMCNC: 29.3 G/DL (ref 31.5–36.5)
MCV RBC AUTO: 84 FL (ref 78–100)
MUCOUS THREADS #/AREA URNS LPF: PRESENT /LPF
NITRATE UR QL: NEGATIVE
PH UR STRIP: 5.5 [PH] (ref 5–7)
PLATELET # BLD AUTO: 412 10E3/UL (ref 150–450)
POTASSIUM SERPL-SCNC: 3.3 MMOL/L (ref 3.4–5.3)
RBC # BLD AUTO: 4.1 10E6/UL (ref 4.4–5.9)
RBC URINE: <1 /HPF
SODIUM SERPL-SCNC: 138 MMOL/L (ref 135–145)
SP GR UR STRIP: 1.01 (ref 1–1.03)
SQUAMOUS EPITHELIAL: <1 /HPF
UROBILINOGEN UR STRIP-MCNC: NORMAL MG/DL
WBC # BLD AUTO: 19.2 10E3/UL (ref 4–11)
WBC URINE: 1 /HPF

## 2025-02-07 PROCEDURE — 80048 BASIC METABOLIC PNL TOTAL CA: CPT | Mod: ORL | Performed by: REGISTERED NURSE

## 2025-02-07 PROCEDURE — 81001 URINALYSIS AUTO W/SCOPE: CPT | Mod: ORL | Performed by: REGISTERED NURSE

## 2025-02-07 PROCEDURE — 85027 COMPLETE CBC AUTOMATED: CPT | Mod: ORL | Performed by: REGISTERED NURSE

## 2025-02-07 PROCEDURE — 87086 URINE CULTURE/COLONY COUNT: CPT | Mod: ORL | Performed by: REGISTERED NURSE

## 2025-02-07 PROCEDURE — P9604 ONE-WAY ALLOW PRORATED TRIP: HCPCS | Mod: ORL | Performed by: REGISTERED NURSE

## 2025-02-07 PROCEDURE — 36415 COLL VENOUS BLD VENIPUNCTURE: CPT | Mod: ORL | Performed by: REGISTERED NURSE

## 2025-02-09 LAB — BACTERIA UR CULT: NORMAL

## 2025-02-10 LAB
ALBUMIN SERPL BCG-MCNC: 3.4 G/DL (ref 3.5–5.2)
ALP SERPL-CCNC: 101 U/L (ref 40–150)
ALT SERPL W P-5'-P-CCNC: 9 U/L (ref 0–70)
ANION GAP SERPL CALCULATED.3IONS-SCNC: 14 MMOL/L (ref 7–15)
AST SERPL W P-5'-P-CCNC: 20 U/L (ref 0–45)
BILIRUB SERPL-MCNC: 0.5 MG/DL
BUN SERPL-MCNC: 63.8 MG/DL (ref 8–23)
CALCIUM SERPL-MCNC: 9 MG/DL (ref 8.8–10.4)
CHLORIDE SERPL-SCNC: 99 MMOL/L (ref 98–107)
CREAT SERPL-MCNC: 2.46 MG/DL (ref 0.67–1.17)
EGFRCR SERPLBLD CKD-EPI 2021: 29 ML/MIN/1.73M2
ERYTHROCYTE [DISTWIDTH] IN BLOOD BY AUTOMATED COUNT: 16.9 % (ref 10–15)
GLUCOSE SERPL-MCNC: 150 MG/DL (ref 70–99)
HCO3 SERPL-SCNC: 25 MMOL/L (ref 22–29)
HCT VFR BLD AUTO: 31.4 % (ref 40–53)
HGB BLD-MCNC: 9 G/DL (ref 13.3–17.7)
MCH RBC QN AUTO: 23.7 PG (ref 26.5–33)
MCHC RBC AUTO-ENTMCNC: 28.7 G/DL (ref 31.5–36.5)
MCV RBC AUTO: 83 FL (ref 78–100)
PLATELET # BLD AUTO: 342 10E3/UL (ref 150–450)
POTASSIUM SERPL-SCNC: 3.7 MMOL/L (ref 3.4–5.3)
PROT SERPL-MCNC: 6.9 G/DL (ref 6.4–8.3)
RBC # BLD AUTO: 3.8 10E6/UL (ref 4.4–5.9)
SODIUM SERPL-SCNC: 138 MMOL/L (ref 135–145)
WBC # BLD AUTO: 15.2 10E3/UL (ref 4–11)

## 2025-02-10 PROCEDURE — P9604 ONE-WAY ALLOW PRORATED TRIP: HCPCS | Mod: ORL | Performed by: REGISTERED NURSE

## 2025-02-10 PROCEDURE — 80053 COMPREHEN METABOLIC PANEL: CPT | Mod: ORL | Performed by: REGISTERED NURSE

## 2025-02-10 PROCEDURE — 85027 COMPLETE CBC AUTOMATED: CPT | Mod: ORL | Performed by: REGISTERED NURSE

## 2025-02-10 PROCEDURE — 36415 COLL VENOUS BLD VENIPUNCTURE: CPT | Mod: ORL | Performed by: REGISTERED NURSE

## 2025-02-12 ENCOUNTER — LAB REQUISITION (OUTPATIENT)
Dept: LAB | Facility: CLINIC | Age: 65
End: 2025-02-12
Payer: COMMERCIAL

## 2025-02-12 DIAGNOSIS — D72.829 ELEVATED WHITE BLOOD CELL COUNT, UNSPECIFIED: ICD-10-CM

## 2025-02-13 ENCOUNTER — LAB REQUISITION (OUTPATIENT)
Dept: LAB | Facility: CLINIC | Age: 65
End: 2025-02-13
Payer: COMMERCIAL

## 2025-02-13 DIAGNOSIS — R19.7 DIARRHEA, UNSPECIFIED: ICD-10-CM

## 2025-02-13 LAB
ANION GAP SERPL CALCULATED.3IONS-SCNC: 17 MMOL/L (ref 7–15)
BUN SERPL-MCNC: 58.3 MG/DL (ref 8–23)
C DIFF TOX B STL QL: NEGATIVE
CALCIUM SERPL-MCNC: 9.5 MG/DL (ref 8.8–10.4)
CHLORIDE SERPL-SCNC: 106 MMOL/L (ref 98–107)
CREAT SERPL-MCNC: 2.16 MG/DL (ref 0.67–1.17)
EGFRCR SERPLBLD CKD-EPI 2021: 34 ML/MIN/1.73M2
ERYTHROCYTE [DISTWIDTH] IN BLOOD BY AUTOMATED COUNT: 16.7 % (ref 10–15)
GLUCOSE SERPL-MCNC: 146 MG/DL (ref 70–99)
HCO3 SERPL-SCNC: 21 MMOL/L (ref 22–29)
HCT VFR BLD AUTO: 33.4 % (ref 40–53)
HGB BLD-MCNC: 9.4 G/DL (ref 13.3–17.7)
MCH RBC QN AUTO: 23.6 PG (ref 26.5–33)
MCHC RBC AUTO-ENTMCNC: 28.1 G/DL (ref 31.5–36.5)
MCV RBC AUTO: 84 FL (ref 78–100)
PLATELET # BLD AUTO: 405 10E3/UL (ref 150–450)
POTASSIUM SERPL-SCNC: 4 MMOL/L (ref 3.4–5.3)
RBC # BLD AUTO: 3.99 10E6/UL (ref 4.4–5.9)
SODIUM SERPL-SCNC: 144 MMOL/L (ref 135–145)
WBC # BLD AUTO: 15.1 10E3/UL (ref 4–11)

## 2025-02-13 PROCEDURE — 87493 C DIFF AMPLIFIED PROBE: CPT | Mod: ORL | Performed by: REGISTERED NURSE

## 2025-02-13 PROCEDURE — 36415 COLL VENOUS BLD VENIPUNCTURE: CPT | Mod: ORL

## 2025-02-13 PROCEDURE — 80048 BASIC METABOLIC PNL TOTAL CA: CPT | Mod: ORL | Performed by: REGISTERED NURSE

## 2025-02-13 PROCEDURE — 85027 COMPLETE CBC AUTOMATED: CPT | Mod: ORL | Performed by: REGISTERED NURSE

## 2025-02-13 PROCEDURE — P9604 ONE-WAY ALLOW PRORATED TRIP: HCPCS | Mod: ORL | Performed by: REGISTERED NURSE

## 2025-02-19 ENCOUNTER — LAB REQUISITION (OUTPATIENT)
Dept: LAB | Facility: CLINIC | Age: 65
End: 2025-02-19
Payer: COMMERCIAL

## 2025-02-19 DIAGNOSIS — D72.9 DISORDER OF WHITE BLOOD CELLS, UNSPECIFIED: ICD-10-CM

## 2025-02-24 LAB
ERYTHROCYTE [DISTWIDTH] IN BLOOD BY AUTOMATED COUNT: 17.5 % (ref 10–15)
HCT VFR BLD AUTO: 31.9 % (ref 40–53)
HGB BLD-MCNC: 8.9 G/DL (ref 13.3–17.7)
MCH RBC QN AUTO: 23.5 PG (ref 26.5–33)
MCHC RBC AUTO-ENTMCNC: 27.9 G/DL (ref 31.5–36.5)
MCV RBC AUTO: 84 FL (ref 78–100)
PLATELET # BLD AUTO: 298 10E3/UL (ref 150–450)
RBC # BLD AUTO: 3.79 10E6/UL (ref 4.4–5.9)
WBC # BLD AUTO: 19.1 10E3/UL (ref 4–11)

## 2025-02-24 PROCEDURE — 36415 COLL VENOUS BLD VENIPUNCTURE: CPT | Mod: ORL | Performed by: REGISTERED NURSE

## 2025-02-24 PROCEDURE — 85027 COMPLETE CBC AUTOMATED: CPT | Mod: ORL | Performed by: REGISTERED NURSE

## 2025-02-24 PROCEDURE — P9604 ONE-WAY ALLOW PRORATED TRIP: HCPCS | Mod: ORL | Performed by: REGISTERED NURSE

## 2025-02-28 ENCOUNTER — LAB REQUISITION (OUTPATIENT)
Dept: LAB | Facility: CLINIC | Age: 65
End: 2025-02-28
Payer: COMMERCIAL

## 2025-02-28 DIAGNOSIS — D72.829 ELEVATED WHITE BLOOD CELL COUNT, UNSPECIFIED: ICD-10-CM

## 2025-03-03 LAB
ERYTHROCYTE [DISTWIDTH] IN BLOOD BY AUTOMATED COUNT: 18 % (ref 10–15)
HCT VFR BLD AUTO: 31.4 % (ref 40–53)
HGB BLD-MCNC: 9.2 G/DL (ref 13.3–17.7)
MCH RBC QN AUTO: 23.7 PG (ref 26.5–33)
MCHC RBC AUTO-ENTMCNC: 29.3 G/DL (ref 31.5–36.5)
MCV RBC AUTO: 81 FL (ref 78–100)
PLATELET # BLD AUTO: 373 10E3/UL (ref 150–450)
RBC # BLD AUTO: 3.88 10E6/UL (ref 4.4–5.9)
WBC # BLD AUTO: 16.6 10E3/UL (ref 4–11)

## 2025-03-03 PROCEDURE — 36415 COLL VENOUS BLD VENIPUNCTURE: CPT | Mod: ORL | Performed by: REGISTERED NURSE

## 2025-03-03 PROCEDURE — P9604 ONE-WAY ALLOW PRORATED TRIP: HCPCS | Mod: ORL | Performed by: REGISTERED NURSE

## 2025-03-03 PROCEDURE — 85027 COMPLETE CBC AUTOMATED: CPT | Mod: ORL | Performed by: REGISTERED NURSE

## 2025-03-11 ENCOUNTER — LAB REQUISITION (OUTPATIENT)
Dept: LAB | Facility: CLINIC | Age: 65
End: 2025-03-11
Payer: COMMERCIAL

## 2025-03-11 DIAGNOSIS — D72.829 ELEVATED WHITE BLOOD CELL COUNT, UNSPECIFIED: ICD-10-CM

## 2025-03-11 DIAGNOSIS — T50.1X5A: ICD-10-CM

## 2025-03-12 LAB
ERYTHROCYTE [DISTWIDTH] IN BLOOD BY AUTOMATED COUNT: 19.2 % (ref 10–15)
GLUCOSE SERPL-MCNC: 84 MG/DL (ref 70–99)
HCT VFR BLD AUTO: 28.9 % (ref 40–53)
HGB BLD-MCNC: 8.4 G/DL (ref 13.3–17.7)
MAGNESIUM SERPL-MCNC: 2.9 MG/DL (ref 1.7–2.3)
MCH RBC QN AUTO: 23.5 PG (ref 26.5–33)
MCHC RBC AUTO-ENTMCNC: 29.1 G/DL (ref 31.5–36.5)
MCV RBC AUTO: 81 FL (ref 78–100)
PLATELET # BLD AUTO: 325 10E3/UL (ref 150–450)
RBC # BLD AUTO: 3.58 10E6/UL (ref 4.4–5.9)
WBC # BLD AUTO: 15.3 10E3/UL (ref 4–11)

## 2025-03-12 PROCEDURE — 85027 COMPLETE CBC AUTOMATED: CPT | Mod: ORL | Performed by: REGISTERED NURSE

## 2025-03-12 PROCEDURE — 83735 ASSAY OF MAGNESIUM: CPT | Mod: ORL | Performed by: INTERNAL MEDICINE

## 2025-03-12 PROCEDURE — 80048 BASIC METABOLIC PNL TOTAL CA: CPT | Mod: ORL | Performed by: INTERNAL MEDICINE

## 2025-03-12 PROCEDURE — P9604 ONE-WAY ALLOW PRORATED TRIP: HCPCS | Mod: ORL | Performed by: REGISTERED NURSE

## 2025-03-12 PROCEDURE — 36415 COLL VENOUS BLD VENIPUNCTURE: CPT | Mod: ORL | Performed by: REGISTERED NURSE

## 2025-03-14 LAB
ANION GAP SERPL CALCULATED.3IONS-SCNC: 11 MMOL/L (ref 7–15)
BUN SERPL-MCNC: 64.1 MG/DL (ref 8–23)
CALCIUM SERPL-MCNC: 9.6 MG/DL (ref 8.8–10.4)
CHLORIDE SERPL-SCNC: 97 MMOL/L (ref 98–107)
CREAT SERPL-MCNC: 2.55 MG/DL (ref 0.67–1.17)
EGFRCR SERPLBLD CKD-EPI 2021: 28 ML/MIN/1.73M2
HCO3 SERPL-SCNC: 30 MMOL/L (ref 22–29)
POTASSIUM SERPL-SCNC: 3.7 MMOL/L (ref 3.4–5.3)
SODIUM SERPL-SCNC: 138 MMOL/L (ref 135–145)

## 2025-04-04 ENCOUNTER — LAB REQUISITION (OUTPATIENT)
Dept: LAB | Facility: CLINIC | Age: 65
End: 2025-04-04
Payer: COMMERCIAL

## 2025-04-04 DIAGNOSIS — E11.9 TYPE 2 DIABETES MELLITUS WITHOUT COMPLICATIONS (H): ICD-10-CM

## 2025-04-07 LAB
ANION GAP SERPL CALCULATED.3IONS-SCNC: 12 MMOL/L (ref 7–15)
BUN SERPL-MCNC: 57.3 MG/DL (ref 8–23)
CALCIUM SERPL-MCNC: 9.4 MG/DL (ref 8.8–10.4)
CHLORIDE SERPL-SCNC: 96 MMOL/L (ref 98–107)
CREAT SERPL-MCNC: 2.69 MG/DL (ref 0.67–1.17)
EGFRCR SERPLBLD CKD-EPI 2021: 26 ML/MIN/1.73M2
ERYTHROCYTE [DISTWIDTH] IN BLOOD BY AUTOMATED COUNT: 18.2 % (ref 10–15)
EST. AVERAGE GLUCOSE BLD GHB EST-MCNC: 171 MG/DL
GLUCOSE SERPL-MCNC: 197 MG/DL (ref 70–99)
HBA1C MFR BLD: 7.6 %
HCO3 SERPL-SCNC: 32 MMOL/L (ref 22–29)
HCT VFR BLD AUTO: 32.7 % (ref 40–53)
HGB BLD-MCNC: 9.4 G/DL (ref 13.3–17.7)
MCH RBC QN AUTO: 23.7 PG (ref 26.5–33)
MCHC RBC AUTO-ENTMCNC: 28.7 G/DL (ref 31.5–36.5)
MCV RBC AUTO: 83 FL (ref 78–100)
PLATELET # BLD AUTO: 311 10E3/UL (ref 150–450)
POTASSIUM SERPL-SCNC: 3.7 MMOL/L (ref 3.4–5.3)
RBC # BLD AUTO: 3.96 10E6/UL (ref 4.4–5.9)
SODIUM SERPL-SCNC: 140 MMOL/L (ref 135–145)
WBC # BLD AUTO: 15.2 10E3/UL (ref 4–11)

## 2025-04-07 PROCEDURE — 85027 COMPLETE CBC AUTOMATED: CPT | Mod: ORL | Performed by: REGISTERED NURSE

## 2025-04-07 PROCEDURE — 83036 HEMOGLOBIN GLYCOSYLATED A1C: CPT | Mod: ORL | Performed by: REGISTERED NURSE

## 2025-04-07 PROCEDURE — P9604 ONE-WAY ALLOW PRORATED TRIP: HCPCS | Mod: ORL | Performed by: REGISTERED NURSE

## 2025-04-07 PROCEDURE — 80048 BASIC METABOLIC PNL TOTAL CA: CPT | Mod: ORL | Performed by: REGISTERED NURSE

## 2025-04-07 PROCEDURE — 36415 COLL VENOUS BLD VENIPUNCTURE: CPT | Mod: ORL | Performed by: REGISTERED NURSE

## 2025-04-30 ENCOUNTER — LAB REQUISITION (OUTPATIENT)
Dept: LAB | Facility: CLINIC | Age: 65
End: 2025-04-30
Payer: COMMERCIAL

## 2025-04-30 DIAGNOSIS — L95.9 VASCULITIS LIMITED TO THE SKIN, UNSPECIFIED: ICD-10-CM

## 2025-04-30 DIAGNOSIS — I50.30 UNSPECIFIED DIASTOLIC (CONGESTIVE) HEART FAILURE (H): ICD-10-CM

## 2025-05-01 LAB
CRP SERPL-MCNC: 40.5 MG/L
ERYTHROCYTE [DISTWIDTH] IN BLOOD BY AUTOMATED COUNT: 18.3 % (ref 10–15)
ERYTHROCYTE [SEDIMENTATION RATE] IN BLOOD BY WESTERGREN METHOD: 52 MM/HR (ref 0–20)
HCT VFR BLD AUTO: 32.2 % (ref 40–53)
HGB BLD-MCNC: 9.3 G/DL (ref 13.3–17.7)
MCH RBC QN AUTO: 23.4 PG (ref 26.5–33)
MCHC RBC AUTO-ENTMCNC: 28.9 G/DL (ref 31.5–36.5)
MCV RBC AUTO: 81 FL (ref 78–100)
PLATELET # BLD AUTO: 340 10E3/UL (ref 150–450)
RBC # BLD AUTO: 3.97 10E6/UL (ref 4.4–5.9)
WBC # BLD AUTO: 16 10E3/UL (ref 4–11)

## 2025-05-01 PROCEDURE — 85652 RBC SED RATE AUTOMATED: CPT | Mod: ORL | Performed by: REGISTERED NURSE

## 2025-05-01 PROCEDURE — P9604 ONE-WAY ALLOW PRORATED TRIP: HCPCS | Mod: ORL | Performed by: REGISTERED NURSE

## 2025-05-01 PROCEDURE — 86140 C-REACTIVE PROTEIN: CPT | Mod: ORL | Performed by: REGISTERED NURSE

## 2025-05-01 PROCEDURE — 85027 COMPLETE CBC AUTOMATED: CPT | Mod: ORL | Performed by: REGISTERED NURSE

## 2025-05-01 PROCEDURE — 36415 COLL VENOUS BLD VENIPUNCTURE: CPT | Mod: ORL | Performed by: REGISTERED NURSE

## 2025-05-02 LAB
ANION GAP SERPL CALCULATED.3IONS-SCNC: 14 MMOL/L (ref 7–15)
BUN SERPL-MCNC: 89.6 MG/DL (ref 8–23)
CALCIUM SERPL-MCNC: 9.2 MG/DL (ref 8.8–10.4)
CHLORIDE SERPL-SCNC: 94 MMOL/L (ref 98–107)
CREAT SERPL-MCNC: 2.97 MG/DL (ref 0.67–1.17)
EGFRCR SERPLBLD CKD-EPI 2021: 23 ML/MIN/1.73M2
GLUCOSE SERPL-MCNC: 271 MG/DL (ref 70–99)
HCO3 SERPL-SCNC: 31 MMOL/L (ref 22–29)
POTASSIUM SERPL-SCNC: 4.2 MMOL/L (ref 3.4–5.3)
SODIUM SERPL-SCNC: 139 MMOL/L (ref 135–145)

## 2025-05-02 PROCEDURE — P9604 ONE-WAY ALLOW PRORATED TRIP: HCPCS | Mod: ORL | Performed by: REGISTERED NURSE

## 2025-05-02 PROCEDURE — 80048 BASIC METABOLIC PNL TOTAL CA: CPT | Mod: ORL | Performed by: REGISTERED NURSE

## 2025-05-02 PROCEDURE — 36415 COLL VENOUS BLD VENIPUNCTURE: CPT | Mod: ORL | Performed by: REGISTERED NURSE

## 2025-05-06 ENCOUNTER — LAB REQUISITION (OUTPATIENT)
Dept: LAB | Facility: CLINIC | Age: 65
End: 2025-05-06
Payer: COMMERCIAL

## 2025-05-06 DIAGNOSIS — L95.9 VASCULITIS LIMITED TO THE SKIN, UNSPECIFIED: ICD-10-CM

## 2025-05-07 LAB
CRP SERPL-MCNC: 24.4 MG/L
ERYTHROCYTE [SEDIMENTATION RATE] IN BLOOD BY WESTERGREN METHOD: 1 MM/HR (ref 0–20)

## 2025-05-07 PROCEDURE — 36415 COLL VENOUS BLD VENIPUNCTURE: CPT | Mod: ORL | Performed by: REGISTERED NURSE

## 2025-05-07 PROCEDURE — 86140 C-REACTIVE PROTEIN: CPT | Mod: ORL | Performed by: REGISTERED NURSE

## 2025-05-07 PROCEDURE — P9604 ONE-WAY ALLOW PRORATED TRIP: HCPCS | Mod: ORL | Performed by: REGISTERED NURSE

## 2025-05-07 PROCEDURE — 85652 RBC SED RATE AUTOMATED: CPT | Mod: ORL | Performed by: REGISTERED NURSE

## 2025-05-08 ENCOUNTER — LAB REQUISITION (OUTPATIENT)
Dept: LAB | Facility: CLINIC | Age: 65
End: 2025-05-08
Payer: COMMERCIAL

## 2025-05-08 DIAGNOSIS — L95.9 VASCULITIS LIMITED TO THE SKIN, UNSPECIFIED: ICD-10-CM

## 2025-05-10 ENCOUNTER — LAB REQUISITION (OUTPATIENT)
Dept: LAB | Facility: CLINIC | Age: 65
End: 2025-05-10
Payer: COMMERCIAL

## 2025-05-10 DIAGNOSIS — L95.9 VASCULITIS LIMITED TO THE SKIN, UNSPECIFIED: ICD-10-CM

## 2025-05-12 LAB
BASOPHILS # BLD AUTO: 0.1 10E3/UL (ref 0–0.2)
BASOPHILS NFR BLD AUTO: 1 %
CRP SERPL-MCNC: 23.9 MG/L
EOSINOPHIL # BLD AUTO: 0.2 10E3/UL (ref 0–0.7)
EOSINOPHIL NFR BLD AUTO: 1 %
ERYTHROCYTE [DISTWIDTH] IN BLOOD BY AUTOMATED COUNT: 18.3 % (ref 10–15)
HCT VFR BLD AUTO: 30.6 % (ref 40–53)
HGB BLD-MCNC: 8.7 G/DL (ref 13.3–17.7)
IMM GRANULOCYTES # BLD: 0.2 10E3/UL
IMM GRANULOCYTES NFR BLD: 1 %
LYMPHOCYTES # BLD AUTO: 0.9 10E3/UL (ref 0.8–5.3)
LYMPHOCYTES NFR BLD AUTO: 6 %
MCH RBC QN AUTO: 23.1 PG (ref 26.5–33)
MCHC RBC AUTO-ENTMCNC: 28.4 G/DL (ref 31.5–36.5)
MCV RBC AUTO: 81 FL (ref 78–100)
MONOCYTES # BLD AUTO: 1.3 10E3/UL (ref 0–1.3)
MONOCYTES NFR BLD AUTO: 8 %
NEUTROPHILS # BLD AUTO: 14.2 10E3/UL (ref 1.6–8.3)
NEUTROPHILS NFR BLD AUTO: 84 %
NRBC # BLD AUTO: 0 10E3/UL
NRBC BLD AUTO-RTO: 0 /100
PLATELET # BLD AUTO: 322 10E3/UL (ref 150–450)
RBC # BLD AUTO: 3.77 10E6/UL (ref 4.4–5.9)
WBC # BLD AUTO: 16.9 10E3/UL (ref 4–11)

## 2025-05-12 PROCEDURE — P9604 ONE-WAY ALLOW PRORATED TRIP: HCPCS | Mod: ORL | Performed by: REGISTERED NURSE

## 2025-05-12 PROCEDURE — 36415 COLL VENOUS BLD VENIPUNCTURE: CPT | Mod: ORL | Performed by: REGISTERED NURSE

## 2025-05-12 PROCEDURE — 85025 COMPLETE CBC W/AUTO DIFF WBC: CPT | Mod: ORL | Performed by: REGISTERED NURSE

## 2025-05-12 PROCEDURE — 86140 C-REACTIVE PROTEIN: CPT | Mod: ORL | Performed by: REGISTERED NURSE

## 2025-06-04 ENCOUNTER — LAB REQUISITION (OUTPATIENT)
Dept: LAB | Facility: CLINIC | Age: 65
End: 2025-06-04
Payer: COMMERCIAL

## 2025-06-04 DIAGNOSIS — M10.9 GOUT, UNSPECIFIED: ICD-10-CM

## 2025-06-05 LAB
ANION GAP SERPL CALCULATED.3IONS-SCNC: 14 MMOL/L (ref 7–15)
BUN SERPL-MCNC: 89.4 MG/DL (ref 8–23)
CALCIUM SERPL-MCNC: 9.7 MG/DL (ref 8.8–10.4)
CHLORIDE SERPL-SCNC: 91 MMOL/L (ref 98–107)
CREAT SERPL-MCNC: 2.7 MG/DL (ref 0.67–1.17)
EGFRCR SERPLBLD CKD-EPI 2021: 26 ML/MIN/1.73M2
ERYTHROCYTE [DISTWIDTH] IN BLOOD BY AUTOMATED COUNT: 17.9 % (ref 10–15)
GLUCOSE SERPL-MCNC: 188 MG/DL (ref 70–99)
HCO3 SERPL-SCNC: 32 MMOL/L (ref 22–29)
HCT VFR BLD AUTO: 33.8 % (ref 40–53)
HGB BLD-MCNC: 9.9 G/DL (ref 13.3–17.7)
MCH RBC QN AUTO: 22.8 PG (ref 26.5–33)
MCHC RBC AUTO-ENTMCNC: 29.3 G/DL (ref 31.5–36.5)
MCV RBC AUTO: 78 FL (ref 78–100)
PLATELET # BLD AUTO: 331 10E3/UL (ref 150–450)
POTASSIUM SERPL-SCNC: 3.1 MMOL/L (ref 3.4–5.3)
RBC # BLD AUTO: 4.35 10E6/UL (ref 4.4–5.9)
SODIUM SERPL-SCNC: 137 MMOL/L (ref 135–145)
URATE SERPL-MCNC: 10 MG/DL (ref 3.4–7)
WBC # BLD AUTO: 15.6 10E3/UL (ref 4–11)

## 2025-06-05 PROCEDURE — 80048 BASIC METABOLIC PNL TOTAL CA: CPT | Mod: ORL | Performed by: REGISTERED NURSE

## 2025-06-05 PROCEDURE — P9604 ONE-WAY ALLOW PRORATED TRIP: HCPCS | Mod: ORL | Performed by: REGISTERED NURSE

## 2025-06-05 PROCEDURE — 84550 ASSAY OF BLOOD/URIC ACID: CPT | Mod: ORL | Performed by: REGISTERED NURSE

## 2025-06-05 PROCEDURE — 85027 COMPLETE CBC AUTOMATED: CPT | Mod: ORL | Performed by: REGISTERED NURSE

## 2025-06-05 PROCEDURE — 36415 COLL VENOUS BLD VENIPUNCTURE: CPT | Mod: ORL | Performed by: REGISTERED NURSE

## 2025-06-09 ENCOUNTER — LAB REQUISITION (OUTPATIENT)
Dept: LAB | Facility: CLINIC | Age: 65
End: 2025-06-09
Payer: COMMERCIAL

## 2025-06-09 DIAGNOSIS — D64.9 ANEMIA, UNSPECIFIED: ICD-10-CM

## 2025-06-12 LAB
ANION GAP SERPL CALCULATED.3IONS-SCNC: 12 MMOL/L (ref 7–15)
BUN SERPL-MCNC: 83 MG/DL (ref 8–23)
CALCIUM SERPL-MCNC: 9.6 MG/DL (ref 8.8–10.4)
CHLORIDE SERPL-SCNC: 98 MMOL/L (ref 98–107)
CREAT SERPL-MCNC: 2.55 MG/DL (ref 0.67–1.17)
EGFRCR SERPLBLD CKD-EPI 2021: 28 ML/MIN/1.73M2
GLUCOSE SERPL-MCNC: 81 MG/DL (ref 70–99)
HCO3 SERPL-SCNC: 30 MMOL/L (ref 22–29)
POTASSIUM SERPL-SCNC: 3.5 MMOL/L (ref 3.4–5.3)
SODIUM SERPL-SCNC: 140 MMOL/L (ref 135–145)

## 2025-06-12 PROCEDURE — 36415 COLL VENOUS BLD VENIPUNCTURE: CPT | Mod: ORL | Performed by: REGISTERED NURSE

## 2025-06-12 PROCEDURE — 80048 BASIC METABOLIC PNL TOTAL CA: CPT | Mod: ORL | Performed by: REGISTERED NURSE

## 2025-06-12 PROCEDURE — P9604 ONE-WAY ALLOW PRORATED TRIP: HCPCS | Mod: ORL | Performed by: REGISTERED NURSE

## 2025-06-30 ENCOUNTER — LAB REQUISITION (OUTPATIENT)
Dept: LAB | Facility: CLINIC | Age: 65
End: 2025-06-30
Payer: COMMERCIAL

## 2025-06-30 DIAGNOSIS — I50.20 UNSPECIFIED SYSTOLIC (CONGESTIVE) HEART FAILURE (H): ICD-10-CM

## 2025-07-02 LAB
ANION GAP SERPL CALCULATED.3IONS-SCNC: 14 MMOL/L (ref 7–15)
BUN SERPL-MCNC: 77.6 MG/DL (ref 8–23)
CALCIUM SERPL-MCNC: 8.9 MG/DL (ref 8.8–10.4)
CHLORIDE SERPL-SCNC: 96 MMOL/L (ref 98–107)
CREAT SERPL-MCNC: 2.78 MG/DL (ref 0.67–1.17)
EGFRCR SERPLBLD CKD-EPI 2021: 25 ML/MIN/1.73M2
GLUCOSE SERPL-MCNC: 166 MG/DL (ref 70–99)
HCO3 SERPL-SCNC: 27 MMOL/L (ref 22–29)
NT-PROBNP SERPL-MCNC: 5812 PG/ML (ref 0–177)
POTASSIUM SERPL-SCNC: 3.5 MMOL/L (ref 3.4–5.3)
SODIUM SERPL-SCNC: 137 MMOL/L (ref 135–145)

## 2025-07-02 PROCEDURE — 83880 ASSAY OF NATRIURETIC PEPTIDE: CPT | Mod: ORL | Performed by: REGISTERED NURSE

## 2025-07-02 PROCEDURE — P9604 ONE-WAY ALLOW PRORATED TRIP: HCPCS | Mod: ORL | Performed by: REGISTERED NURSE

## 2025-07-02 PROCEDURE — 80048 BASIC METABOLIC PNL TOTAL CA: CPT | Mod: ORL | Performed by: REGISTERED NURSE

## 2025-07-02 PROCEDURE — 36415 COLL VENOUS BLD VENIPUNCTURE: CPT | Mod: ORL | Performed by: REGISTERED NURSE

## 2025-07-07 ENCOUNTER — LAB REQUISITION (OUTPATIENT)
Dept: LAB | Facility: CLINIC | Age: 65
End: 2025-07-07
Payer: COMMERCIAL

## 2025-07-07 DIAGNOSIS — I50.20 UNSPECIFIED SYSTOLIC (CONGESTIVE) HEART FAILURE (H): ICD-10-CM

## 2025-07-10 LAB
ANION GAP SERPL CALCULATED.3IONS-SCNC: 15 MMOL/L (ref 7–15)
BUN SERPL-MCNC: 82.5 MG/DL (ref 8–23)
CALCIUM SERPL-MCNC: 9.7 MG/DL (ref 8.8–10.4)
CHLORIDE SERPL-SCNC: 90 MMOL/L (ref 98–107)
CREAT SERPL-MCNC: 2.64 MG/DL (ref 0.67–1.17)
EGFRCR SERPLBLD CKD-EPI 2021: 27 ML/MIN/1.73M2
GLUCOSE SERPL-MCNC: 219 MG/DL (ref 70–99)
HCO3 SERPL-SCNC: 32 MMOL/L (ref 22–29)
NT-PROBNP SERPL-MCNC: 4820 PG/ML (ref 0–177)
POTASSIUM SERPL-SCNC: 3.1 MMOL/L (ref 3.4–5.3)
SODIUM SERPL-SCNC: 137 MMOL/L (ref 135–145)

## 2025-07-10 PROCEDURE — 80048 BASIC METABOLIC PNL TOTAL CA: CPT | Mod: ORL | Performed by: REGISTERED NURSE

## 2025-07-10 PROCEDURE — 36415 COLL VENOUS BLD VENIPUNCTURE: CPT | Mod: ORL | Performed by: REGISTERED NURSE

## 2025-07-10 PROCEDURE — P9603 ONE-WAY ALLOW PRORATED MILES: HCPCS | Mod: ORL | Performed by: REGISTERED NURSE

## 2025-07-10 PROCEDURE — 83880 ASSAY OF NATRIURETIC PEPTIDE: CPT | Mod: ORL | Performed by: REGISTERED NURSE

## 2025-07-15 ENCOUNTER — LAB REQUISITION (OUTPATIENT)
Dept: LAB | Facility: CLINIC | Age: 65
End: 2025-07-15
Payer: COMMERCIAL

## 2025-07-15 DIAGNOSIS — E87.6 HYPOKALEMIA: ICD-10-CM

## 2025-07-16 LAB
ANION GAP SERPL CALCULATED.3IONS-SCNC: 15 MMOL/L (ref 7–15)
BUN SERPL-MCNC: 102 MG/DL (ref 8–23)
CALCIUM SERPL-MCNC: 9.3 MG/DL (ref 8.8–10.4)
CHLORIDE SERPL-SCNC: 94 MMOL/L (ref 98–107)
CREAT SERPL-MCNC: 3.19 MG/DL (ref 0.67–1.17)
EGFRCR SERPLBLD CKD-EPI 2021: 21 ML/MIN/1.73M2
GLUCOSE SERPL-MCNC: 230 MG/DL (ref 70–99)
HCO3 SERPL-SCNC: 31 MMOL/L (ref 22–29)
POTASSIUM SERPL-SCNC: 3.7 MMOL/L (ref 3.4–5.3)
SODIUM SERPL-SCNC: 140 MMOL/L (ref 135–145)

## 2025-07-16 PROCEDURE — P9604 ONE-WAY ALLOW PRORATED TRIP: HCPCS | Mod: ORL | Performed by: REGISTERED NURSE

## 2025-07-16 PROCEDURE — 36415 COLL VENOUS BLD VENIPUNCTURE: CPT | Mod: ORL | Performed by: REGISTERED NURSE

## 2025-07-16 PROCEDURE — 80048 BASIC METABOLIC PNL TOTAL CA: CPT | Mod: ORL | Performed by: REGISTERED NURSE

## 2025-07-22 ENCOUNTER — LAB REQUISITION (OUTPATIENT)
Dept: LAB | Facility: CLINIC | Age: 65
End: 2025-07-22
Payer: COMMERCIAL

## 2025-07-22 DIAGNOSIS — N18.9 CHRONIC KIDNEY DISEASE, UNSPECIFIED: ICD-10-CM

## 2025-07-24 LAB
ANION GAP SERPL CALCULATED.3IONS-SCNC: 12 MMOL/L (ref 7–15)
BUN SERPL-MCNC: 91.5 MG/DL (ref 8–23)
CALCIUM SERPL-MCNC: 10.3 MG/DL (ref 8.8–10.4)
CHLORIDE SERPL-SCNC: 89 MMOL/L (ref 98–107)
CREAT SERPL-MCNC: 2.78 MG/DL (ref 0.67–1.17)
EGFRCR SERPLBLD CKD-EPI 2021: 25 ML/MIN/1.73M2
GLUCOSE SERPL-MCNC: 173 MG/DL (ref 70–99)
HCO3 SERPL-SCNC: 37 MMOL/L (ref 22–29)
POTASSIUM SERPL-SCNC: 3.7 MMOL/L (ref 3.4–5.3)
SODIUM SERPL-SCNC: 138 MMOL/L (ref 135–145)

## 2025-07-24 PROCEDURE — P9604 ONE-WAY ALLOW PRORATED TRIP: HCPCS | Mod: ORL | Performed by: REGISTERED NURSE

## 2025-07-24 PROCEDURE — 36415 COLL VENOUS BLD VENIPUNCTURE: CPT | Mod: ORL | Performed by: REGISTERED NURSE

## 2025-07-24 PROCEDURE — 80048 BASIC METABOLIC PNL TOTAL CA: CPT | Mod: ORL | Performed by: REGISTERED NURSE

## 2025-07-25 ENCOUNTER — LAB REQUISITION (OUTPATIENT)
Dept: LAB | Facility: CLINIC | Age: 65
End: 2025-07-25
Payer: COMMERCIAL

## 2025-07-25 DIAGNOSIS — R39.11 HESITANCY OF MICTURITION: ICD-10-CM

## 2025-07-25 LAB
ALBUMIN UR-MCNC: 30 MG/DL
APPEARANCE UR: CLEAR
BACTERIA #/AREA URNS HPF: ABNORMAL /HPF
BILIRUB UR QL STRIP: NEGATIVE
COLOR UR AUTO: ABNORMAL
GLUCOSE UR STRIP-MCNC: NEGATIVE MG/DL
HGB UR QL STRIP: ABNORMAL
HYALINE CASTS: 3 /LPF
KETONES UR STRIP-MCNC: NEGATIVE MG/DL
LEUKOCYTE ESTERASE UR QL STRIP: ABNORMAL
MUCOUS THREADS #/AREA URNS LPF: PRESENT /LPF
NITRATE UR QL: NEGATIVE
PH UR STRIP: 7.5 [PH] (ref 5–7)
RBC URINE: <1 /HPF
SP GR UR STRIP: 1.01 (ref 1–1.03)
SQUAMOUS EPITHELIAL: <1 /HPF
UROBILINOGEN UR STRIP-MCNC: NORMAL MG/DL
WBC URINE: 85 /HPF

## 2025-07-25 PROCEDURE — 81001 URINALYSIS AUTO W/SCOPE: CPT | Mod: ORL | Performed by: REGISTERED NURSE

## 2025-07-25 PROCEDURE — 87088 URINE BACTERIA CULTURE: CPT | Mod: ORL | Performed by: REGISTERED NURSE

## 2025-07-26 LAB — BACTERIA UR CULT: ABNORMAL

## 2025-08-06 ENCOUNTER — LAB REQUISITION (OUTPATIENT)
Dept: LAB | Facility: CLINIC | Age: 65
End: 2025-08-06
Payer: COMMERCIAL

## 2025-08-06 DIAGNOSIS — I50.20 UNSPECIFIED SYSTOLIC (CONGESTIVE) HEART FAILURE (H): ICD-10-CM

## 2025-08-07 LAB
ANION GAP SERPL CALCULATED.3IONS-SCNC: 12 MMOL/L (ref 7–15)
BUN SERPL-MCNC: 106 MG/DL (ref 8–23)
CALCIUM SERPL-MCNC: 9.4 MG/DL (ref 8.8–10.4)
CHLORIDE SERPL-SCNC: 91 MMOL/L (ref 98–107)
CREAT SERPL-MCNC: 2.56 MG/DL (ref 0.67–1.17)
EGFRCR SERPLBLD CKD-EPI 2021: 28 ML/MIN/1.73M2
ERYTHROCYTE [DISTWIDTH] IN BLOOD BY AUTOMATED COUNT: 19.2 % (ref 10–15)
GLUCOSE SERPL-MCNC: 222 MG/DL (ref 70–99)
HCO3 SERPL-SCNC: 33 MMOL/L (ref 22–29)
HCT VFR BLD AUTO: 36.7 % (ref 40–53)
HGB BLD-MCNC: 10.8 G/DL (ref 13.3–17.7)
MCH RBC QN AUTO: 23.9 PG (ref 26.5–33)
MCHC RBC AUTO-ENTMCNC: 29.4 G/DL (ref 31.5–36.5)
MCV RBC AUTO: 81 FL (ref 78–100)
PLATELET # BLD AUTO: 247 10E3/UL (ref 150–450)
POTASSIUM SERPL-SCNC: 3.5 MMOL/L (ref 3.4–5.3)
RBC # BLD AUTO: 4.52 10E6/UL (ref 4.4–5.9)
SODIUM SERPL-SCNC: 136 MMOL/L (ref 135–145)
WBC # BLD AUTO: 15.7 10E3/UL (ref 4–11)

## 2025-08-07 PROCEDURE — P9604 ONE-WAY ALLOW PRORATED TRIP: HCPCS | Mod: ORL | Performed by: REGISTERED NURSE

## 2025-08-07 PROCEDURE — 36415 COLL VENOUS BLD VENIPUNCTURE: CPT | Mod: ORL | Performed by: REGISTERED NURSE

## 2025-08-07 PROCEDURE — 80048 BASIC METABOLIC PNL TOTAL CA: CPT | Mod: ORL | Performed by: REGISTERED NURSE

## 2025-08-07 PROCEDURE — 85027 COMPLETE CBC AUTOMATED: CPT | Mod: ORL | Performed by: REGISTERED NURSE

## 2025-08-11 ENCOUNTER — LAB REQUISITION (OUTPATIENT)
Dept: LAB | Facility: CLINIC | Age: 65
End: 2025-08-11
Payer: COMMERCIAL

## 2025-08-11 DIAGNOSIS — N30.90 CYSTITIS, UNSPECIFIED WITHOUT HEMATURIA: ICD-10-CM

## 2025-08-11 LAB
ALBUMIN UR-MCNC: 20 MG/DL
APPEARANCE UR: CLEAR
BACTERIA #/AREA URNS HPF: ABNORMAL /HPF
BILIRUB UR QL STRIP: NEGATIVE
COLOR UR AUTO: ABNORMAL
GLUCOSE UR STRIP-MCNC: NEGATIVE MG/DL
HGB UR QL STRIP: ABNORMAL
KETONES UR STRIP-MCNC: NEGATIVE MG/DL
LEUKOCYTE ESTERASE UR QL STRIP: ABNORMAL
NITRATE UR QL: NEGATIVE
PH UR STRIP: 6 [PH] (ref 5–7)
RBC URINE: 2 /HPF
SP GR UR STRIP: 1.01 (ref 1–1.03)
UROBILINOGEN UR STRIP-MCNC: NORMAL MG/DL
WBC URINE: 61 /HPF

## 2025-08-11 PROCEDURE — 87186 SC STD MICRODIL/AGAR DIL: CPT | Mod: ORL | Performed by: INTERNAL MEDICINE

## 2025-08-12 LAB — BACTERIA UR CULT: ABNORMAL

## 2025-08-13 ENCOUNTER — LAB REQUISITION (OUTPATIENT)
Dept: LAB | Facility: CLINIC | Age: 65
End: 2025-08-13
Payer: COMMERCIAL